# Patient Record
Sex: MALE | Race: WHITE | NOT HISPANIC OR LATINO | Employment: UNEMPLOYED | URBAN - METROPOLITAN AREA
[De-identification: names, ages, dates, MRNs, and addresses within clinical notes are randomized per-mention and may not be internally consistent; named-entity substitution may affect disease eponyms.]

---

## 2020-04-20 ENCOUNTER — HOSPITAL ENCOUNTER (EMERGENCY)
Facility: HOSPITAL | Age: 35
Discharge: HOME | End: 2020-04-20
Attending: EMERGENCY MEDICINE

## 2020-04-20 VITALS
OXYGEN SATURATION: 97 % | DIASTOLIC BLOOD PRESSURE: 59 MMHG | HEIGHT: 71 IN | BODY MASS INDEX: 37.1 KG/M2 | HEART RATE: 110 BPM | TEMPERATURE: 98 F | WEIGHT: 265 LBS | SYSTOLIC BLOOD PRESSURE: 115 MMHG | RESPIRATION RATE: 11 BRPM

## 2020-04-20 DIAGNOSIS — K59.00 CONSTIPATION, UNSPECIFIED CONSTIPATION TYPE: Primary | ICD-10-CM

## 2020-04-20 DIAGNOSIS — R50.9 FEVER, UNSPECIFIED FEVER CAUSE: ICD-10-CM

## 2020-04-20 DIAGNOSIS — R00.0 TACHYCARDIA: ICD-10-CM

## 2020-04-20 DIAGNOSIS — K57.90 DIVERTICULOSIS: ICD-10-CM

## 2020-04-20 LAB
ALBUMIN SERPL BCP-MCNC: 3.7 G/DL (ref 3.5–5.2)
ALP SERPL-CCNC: 87 U/L (ref 55–135)
ALT SERPL W/O P-5'-P-CCNC: 39 U/L (ref 10–44)
AMPHET+METHAMPHET UR QL: NORMAL
ANION GAP SERPL CALC-SCNC: 7 MMOL/L (ref 8–16)
APTT BLDCRRT: 38.4 SEC (ref 21–32)
AST SERPL-CCNC: 38 U/L (ref 10–40)
BARBITURATES UR QL SCN>200 NG/ML: NEGATIVE
BASOPHILS # BLD AUTO: 0.03 K/UL (ref 0–0.2)
BASOPHILS NFR BLD: 0.3 % (ref 0–1.9)
BENZODIAZ UR QL SCN>200 NG/ML: NEGATIVE
BILIRUB SERPL-MCNC: 0.9 MG/DL (ref 0.1–1)
BILIRUB UR QL STRIP: NEGATIVE
BUN SERPL-MCNC: 10 MG/DL (ref 6–20)
BZE UR QL SCN: NEGATIVE
CALCIUM SERPL-MCNC: 8.6 MG/DL (ref 8.7–10.5)
CANNABINOIDS UR QL SCN: NORMAL
CHLORIDE SERPL-SCNC: 98 MMOL/L (ref 95–110)
CLARITY UR: CLEAR
CO2 SERPL-SCNC: 25 MMOL/L (ref 23–29)
COLOR UR: YELLOW
CREAT SERPL-MCNC: 0.7 MG/DL (ref 0.5–1.4)
CREAT UR-MCNC: 141.6 MG/DL (ref 23–375)
DIFFERENTIAL METHOD: ABNORMAL
EOSINOPHIL # BLD AUTO: 0 K/UL (ref 0–0.5)
EOSINOPHIL NFR BLD: 0.1 % (ref 0–8)
ERYTHROCYTE [DISTWIDTH] IN BLOOD BY AUTOMATED COUNT: 12.8 % (ref 11.5–14.5)
EST. GFR  (AFRICAN AMERICAN): >60 ML/MIN/1.73 M^2
EST. GFR  (NON AFRICAN AMERICAN): >60 ML/MIN/1.73 M^2
GLUCOSE SERPL-MCNC: 106 MG/DL (ref 70–110)
GLUCOSE UR QL STRIP: NEGATIVE
GROUP A STREP, MOLECULAR: NEGATIVE
HCT VFR BLD AUTO: 41.4 % (ref 40–54)
HGB BLD-MCNC: 13.7 G/DL (ref 14–18)
HGB UR QL STRIP: NEGATIVE
IMM GRANULOCYTES # BLD AUTO: 0.03 K/UL (ref 0–0.04)
IMM GRANULOCYTES NFR BLD AUTO: 0.3 % (ref 0–0.5)
INFLUENZA A, MOLECULAR: NEGATIVE
INFLUENZA B, MOLECULAR: NEGATIVE
INR PPP: 1.4 (ref 0.8–1.2)
KETONES UR QL STRIP: NEGATIVE
LACTATE SERPL-SCNC: 1.4 MMOL/L (ref 0.5–2.2)
LEUKOCYTE ESTERASE UR QL STRIP: NEGATIVE
LIPASE SERPL-CCNC: 17 U/L (ref 4–60)
LYMPHOCYTES # BLD AUTO: 1.2 K/UL (ref 1–4.8)
LYMPHOCYTES NFR BLD: 12.3 % (ref 18–48)
MCH RBC QN AUTO: 28.7 PG (ref 27–31)
MCHC RBC AUTO-ENTMCNC: 33.1 G/DL (ref 32–36)
MCV RBC AUTO: 87 FL (ref 82–98)
MONOCYTES # BLD AUTO: 1 K/UL (ref 0.3–1)
MONOCYTES NFR BLD: 10.4 % (ref 4–15)
NEUTROPHILS # BLD AUTO: 7.3 K/UL (ref 1.8–7.7)
NEUTROPHILS NFR BLD: 76.6 % (ref 38–73)
NITRITE UR QL STRIP: NEGATIVE
NRBC BLD-RTO: 0 /100 WBC
OPIATES UR QL SCN: NORMAL
PCP UR QL SCN>25 NG/ML: NEGATIVE
PH UR STRIP: 8 [PH] (ref 5–8)
PLATELET # BLD AUTO: 177 K/UL (ref 150–350)
PMV BLD AUTO: 9.9 FL (ref 9.2–12.9)
POTASSIUM SERPL-SCNC: 4 MMOL/L (ref 3.5–5.1)
PROT SERPL-MCNC: 7.9 G/DL (ref 6–8.4)
PROT UR QL STRIP: NEGATIVE
PROTHROMBIN TIME: 15.4 SEC (ref 9–12.5)
RBC # BLD AUTO: 4.78 M/UL (ref 4.6–6.2)
SODIUM SERPL-SCNC: 130 MMOL/L (ref 136–145)
SP GR UR STRIP: 1.01 (ref 1–1.03)
SPECIMEN SOURCE: NORMAL
TOXICOLOGY INFORMATION: NORMAL
URN SPEC COLLECT METH UR: NORMAL
UROBILINOGEN UR STRIP-ACNC: 1 EU/DL
WBC # BLD AUTO: 9.54 K/UL (ref 3.9–12.7)

## 2020-04-20 PROCEDURE — 71045 X-RAY EXAM CHEST 1 VIEW: CPT | Mod: TC,FY

## 2020-04-20 PROCEDURE — 71045 X-RAY EXAM CHEST 1 VIEW: CPT | Mod: 26,,, | Performed by: RADIOLOGY

## 2020-04-20 PROCEDURE — 87040 BLOOD CULTURE FOR BACTERIA: CPT

## 2020-04-20 PROCEDURE — 85730 THROMBOPLASTIN TIME PARTIAL: CPT

## 2020-04-20 PROCEDURE — 83690 ASSAY OF LIPASE: CPT

## 2020-04-20 PROCEDURE — 81003 URINALYSIS AUTO W/O SCOPE: CPT | Mod: 59

## 2020-04-20 PROCEDURE — 85025 COMPLETE CBC W/AUTO DIFF WBC: CPT

## 2020-04-20 PROCEDURE — 36415 COLL VENOUS BLD VENIPUNCTURE: CPT

## 2020-04-20 PROCEDURE — 99285 EMERGENCY DEPT VISIT HI MDM: CPT | Mod: 25

## 2020-04-20 PROCEDURE — 94760 N-INVAS EAR/PLS OXIMETRY 1: CPT

## 2020-04-20 PROCEDURE — 74176 CT ABD & PELVIS W/O CONTRAST: CPT | Mod: TC

## 2020-04-20 PROCEDURE — 74176 CT RENAL STONE STUDY ABD PELVIS WO: ICD-10-PCS | Mod: 26,,, | Performed by: RADIOLOGY

## 2020-04-20 PROCEDURE — 25000003 PHARM REV CODE 250: Performed by: EMERGENCY MEDICINE

## 2020-04-20 PROCEDURE — 74176 CT ABD & PELVIS W/O CONTRAST: CPT | Mod: 26,,, | Performed by: RADIOLOGY

## 2020-04-20 PROCEDURE — 71045 XR CHEST AP PORTABLE: ICD-10-PCS | Mod: 26,,, | Performed by: RADIOLOGY

## 2020-04-20 PROCEDURE — 87502 INFLUENZA DNA AMP PROBE: CPT

## 2020-04-20 PROCEDURE — 96361 HYDRATE IV INFUSION ADD-ON: CPT

## 2020-04-20 PROCEDURE — 87651 STREP A DNA AMP PROBE: CPT

## 2020-04-20 PROCEDURE — 80053 COMPREHEN METABOLIC PANEL: CPT

## 2020-04-20 PROCEDURE — 83605 ASSAY OF LACTIC ACID: CPT

## 2020-04-20 PROCEDURE — 96360 HYDRATION IV INFUSION INIT: CPT

## 2020-04-20 PROCEDURE — 80307 DRUG TEST PRSMV CHEM ANLYZR: CPT

## 2020-04-20 PROCEDURE — 85610 PROTHROMBIN TIME: CPT

## 2020-04-20 RX ORDER — ACETAMINOPHEN 325 MG/1
650 TABLET ORAL
Status: COMPLETED | OUTPATIENT
Start: 2020-04-20 | End: 2020-04-20

## 2020-04-20 RX ADMIN — ACETAMINOPHEN 650 MG: 325 TABLET ORAL at 02:04

## 2020-04-20 RX ADMIN — SODIUM CHLORIDE 3606 ML: 0.9 INJECTION, SOLUTION INTRAVENOUS at 12:04

## 2020-04-20 NOTE — ED TRIAGE NOTES
Patient presents to ED POV with c/o fever, fatigue, and vomiting x1 month. He also reports MERSA that began on his hand and has spread. He reports that he finished antibiotics 2 weeks ago. Unsure of name. He is AAOx4. Skin warm, dry to touch. Respirations even, nonlabored.

## 2020-04-21 NOTE — ED PROVIDER NOTES
"Encounter Date: 4/20/2020       History     Chief Complaint   Patient presents with    Vomiting    Weakness     34-year-old male no significant past medical history presents to the ED for evaluation of fever, fatigue, and vomiting x1 month.  States he was seen for "MRSA" that began on his hand and "has spread" despite finishing unknown antibiotics approximately 2 weeks ago.  Denies chills, diarrhea, cough, URI symptoms.  Denies shortness of breath, chest pain, palpitations, diaphoresis.        Review of patient's allergies indicates:  No Known Allergies  History reviewed. No pertinent past medical history.  History reviewed. No pertinent surgical history.  History reviewed. No pertinent family history.  Social History     Tobacco Use    Smoking status: Current Every Day Smoker     Packs/day: 1.00     Types: Cigarettes    Smokeless tobacco: Never Used   Substance Use Topics    Alcohol use: Never     Frequency: Never    Drug use: Yes     Types: Methamphetamines     Comment: last used one week ago     Review of Systems   Constitutional: Positive for fatigue and fever. Negative for appetite change, chills and diaphoresis.   HENT: Negative for congestion, ear pain, rhinorrhea, sinus pressure, sinus pain, sore throat and tinnitus.    Eyes: Negative for photophobia and visual disturbance.   Respiratory: Negative for cough, chest tightness, shortness of breath and wheezing.    Cardiovascular: Negative for chest pain, palpitations and leg swelling.   Gastrointestinal: Positive for vomiting. Negative for abdominal pain, constipation, diarrhea and nausea.   Endocrine: Negative for cold intolerance, heat intolerance, polydipsia, polyphagia and polyuria.   Genitourinary: Negative for decreased urine volume, difficulty urinating, dysuria, flank pain, frequency, hematuria and urgency.   Musculoskeletal: Negative for arthralgias, back pain, gait problem, joint swelling, myalgias, neck pain and neck stiffness.   Skin: Positive " for color change. Negative for pallor, rash and wound.   Allergic/Immunologic: Negative for immunocompromised state.   Neurological: Positive for weakness. Negative for dizziness, syncope, light-headedness, numbness and headaches.   Hematological: Negative for adenopathy. Does not bruise/bleed easily.   Psychiatric/Behavioral: Negative for decreased concentration, dysphoric mood and sleep disturbance. The patient is not nervous/anxious.    All other systems reviewed and are negative.      Physical Exam     Initial Vitals [04/20/20 1205]   BP Pulse Resp Temp SpO2   126/77 (!) 114 16 (!) 100.8 °F (38.2 °C) 98 %      MAP       --         Physical Exam    Nursing note and vitals reviewed.  Constitutional: He appears well-developed and well-nourished. He is not diaphoretic. No distress.   Febrile   HENT:   Head: Normocephalic and atraumatic.   Right Ear: External ear normal.   Left Ear: External ear normal.   Nose: Nose normal.   Mouth/Throat: Oropharynx is clear and moist.   Eyes: Conjunctivae are normal. Pupils are equal, round, and reactive to light. No scleral icterus.   Neck: Normal range of motion. Neck supple. No JVD present.   Cardiovascular: Regular rhythm, normal heart sounds and intact distal pulses. Tachycardia present.    Pulmonary/Chest: Breath sounds normal. No respiratory distress. He has no wheezes. He has no rhonchi. He has no rales. He exhibits no tenderness.   Abdominal: Soft. Bowel sounds are normal. He exhibits no distension. There is no tenderness. There is no rebound and no guarding.   Musculoskeletal: Normal range of motion. He exhibits no edema or tenderness.   Lymphadenopathy:     He has no cervical adenopathy.   Neurological: He is alert and oriented to person, place, and time. GCS score is 15. GCS eye subscore is 4. GCS verbal subscore is 5. GCS motor subscore is 6.   Skin: Skin is warm and dry. Capillary refill takes less than 2 seconds. No rash and no abscess noted. No erythema. No pallor.    Multiple healing abscesses   Psychiatric: He has a normal mood and affect. His behavior is normal. Judgment and thought content normal.         ED Course   Procedures  Labs Reviewed   CBC W/ AUTO DIFFERENTIAL - Abnormal; Notable for the following components:       Result Value    Hemoglobin 13.7 (*)     Gran% 76.6 (*)     Lymph% 12.3 (*)     All other components within normal limits   COMPREHENSIVE METABOLIC PANEL - Abnormal; Notable for the following components:    Sodium 130 (*)     Calcium 8.6 (*)     Anion Gap 7 (*)     All other components within normal limits   APTT - Abnormal; Notable for the following components:    aPTT 38.4 (*)     All other components within normal limits   PROTIME-INR - Abnormal; Notable for the following components:    Prothrombin Time 15.4 (*)     INR 1.4 (*)     All other components within normal limits   CULTURE, BLOOD    Narrative:     Aerobic and anaerobic   CULTURE, BLOOD    Narrative:     Aerobic and anaerobic   INFLUENZA A & B BY MOLECULAR   GROUP A STREP, MOLECULAR   LACTIC ACID, PLASMA   URINALYSIS, REFLEX TO URINE CULTURE    Narrative:     Preferred Collection Type->Urine, Clean Catch   LIPASE   DRUG SCREEN PANEL, URINE EMERGENCY    Narrative:     Preferred Collection Type->Urine, Clean Catch          Imaging Results          CT Renal Stone Study ABD Pelvis WO (Final result)  Result time 04/20/20 14:42:06    Final result by Corey Gilmore MD (04/20/20 14:42:06)                 Impression:      1. No renal calculi.  2. Large amount of stool throughout the colon and rectum without plain film evidence for bowel obstruction.  3. Diverticulosis without CT evidence to suggest acute diverticulitis.      Electronically signed by: Corey Gilmore  Date:    04/20/2020  Time:    14:42             Narrative:    EXAMINATION:  CT RENAL STONE STUDY ABD PELVIS WO    CLINICAL HISTORY:  Abdominal pain, unspecified;Flank pain, stone disease suspected;    TECHNIQUE:  Low dose axial images,  sagittal and coronal reformations were obtained from the lung bases to the pubic symphysis.  Contrast was not administered.    COMPARISON:  None    FINDINGS:  There is linear discoid atelectasis at the lung bases.  No pleural or pericardial effusions.    The liver and spleen are normal in size and attenuation.  The gallbladder, pancreas and adrenal glands are unremarkable.    Kidneys are normal in size and attenuation.  No renal calculi.  No changes of hydronephrosis.  No perinephric inflammatory change.    Large amount of stool throughout the colon and rectum.  Scattered diverticula within the colon.  No mesenteric inflammatory change.  Appendix is normal in caliber.    The bladder is distended.  Prostate, seminal vesicles and rectum are unremarkable.    No significant mesenteric or retroperitoneal lymphadenopathy.                               X-Ray Chest AP Portable (Final result)  Result time 04/20/20 12:44:00    Final result by Corey Gilmore MD (04/20/20 12:44:00)                 Impression:      Mild pulmonary hypoinflation without focal consolidation.      Electronically signed by: Corey Gilmore  Date:    04/20/2020  Time:    12:44             Narrative:    EXAMINATION:  XR CHEST AP PORTABLE    CLINICAL HISTORY:  Sepsis;    TECHNIQUE:  Single frontal view of the chest was performed.    COMPARISON:  None    FINDINGS:  There is mild pulmonary hypoinflation mild crowding of the bronchopulmonary vessels.  No focal consolidation.  No significant pleural effusion.    Heart size is normal.  Mediastinal contours unremarkable.  Trachea midline.    Bony thorax intact.                                 Medical Decision Making:   ED Management:  Patient admits to a history of IV drug abuse; however, no murmur, no splinter hemorrhages/petechia, no Janeway lesions, no Osler's nodes, no Chaudhari spots on exam.  Workup seemingly normal.  Blood cultures have been obtained.  As recommended this patient undergo an outpatient  nonemergent echo for further evaluation.  He has been provided a referral to primary care for follow-up.                                   Clinical Impression:       ICD-10-CM ICD-9-CM   1. Constipation, unspecified constipation type K59.00 564.00   2. Tachycardia R00.0 785.0   3. Diverticulosis K57.90 562.10   4. Fever, unspecified fever cause R50.9 780.60         Disposition:   Disposition: Discharged  Condition: Stable     ED Disposition Condition    Discharge Stable        ED Prescriptions     None        Follow-up Information    None                                    Meme Malik MD  04/21/20 0849

## 2020-04-24 LAB
BACTERIA BLD CULT: NORMAL
BACTERIA BLD CULT: NORMAL

## 2024-08-04 PROCEDURE — 99285 EMERGENCY DEPT VISIT HI MDM: CPT | Mod: 25

## 2024-08-05 ENCOUNTER — HOSPITAL ENCOUNTER (EMERGENCY)
Facility: HOSPITAL | Age: 39
Discharge: HOME | End: 2024-08-05
Attending: EMERGENCY MEDICINE
Payer: COMMERCIAL

## 2024-08-05 VITALS
SYSTOLIC BLOOD PRESSURE: 110 MMHG | WEIGHT: 260 LBS | HEART RATE: 73 BPM | RESPIRATION RATE: 16 BRPM | HEIGHT: 71 IN | OXYGEN SATURATION: 99 % | TEMPERATURE: 98 F | DIASTOLIC BLOOD PRESSURE: 69 MMHG | BODY MASS INDEX: 36.4 KG/M2

## 2024-08-05 DIAGNOSIS — R11.2 NAUSEA WITH VOMITING: ICD-10-CM

## 2024-08-05 DIAGNOSIS — K59.00 CONSTIPATION, UNSPECIFIED CONSTIPATION TYPE: Primary | ICD-10-CM

## 2024-08-05 LAB
ALBUMIN SERPL BCP-MCNC: 3.7 G/DL (ref 3.5–5.2)
ALP SERPL-CCNC: 93 U/L (ref 55–135)
ALT SERPL W/O P-5'-P-CCNC: 16 U/L (ref 10–44)
ANION GAP SERPL CALC-SCNC: 10 MMOL/L (ref 8–16)
AST SERPL-CCNC: 21 U/L (ref 10–40)
BASOPHILS # BLD AUTO: 0.02 K/UL (ref 0–0.2)
BASOPHILS NFR BLD: 0.4 % (ref 0–1.9)
BILIRUB SERPL-MCNC: 0.3 MG/DL (ref 0.1–1)
BILIRUB UR QL STRIP: NEGATIVE
BUN SERPL-MCNC: 11 MG/DL (ref 6–20)
BUN SERPL-MCNC: 14 MG/DL (ref 6–30)
CALCIUM SERPL-MCNC: 9.6 MG/DL (ref 8.7–10.5)
CHLORIDE SERPL-SCNC: 101 MMOL/L (ref 95–110)
CHLORIDE SERPL-SCNC: 104 MMOL/L (ref 95–110)
CLARITY UR REFRACT.AUTO: CLEAR
CO2 SERPL-SCNC: 25 MMOL/L (ref 23–29)
COLOR UR AUTO: YELLOW
CREAT SERPL-MCNC: 0.8 MG/DL (ref 0.5–1.4)
CREAT SERPL-MCNC: 0.9 MG/DL (ref 0.5–1.4)
DIFFERENTIAL METHOD BLD: ABNORMAL
EOSINOPHIL # BLD AUTO: 0.1 K/UL (ref 0–0.5)
EOSINOPHIL NFR BLD: 1.8 % (ref 0–8)
ERYTHROCYTE [DISTWIDTH] IN BLOOD BY AUTOMATED COUNT: 12.3 % (ref 11.5–14.5)
EST. GFR  (NO RACE VARIABLE): >60 ML/MIN/1.73 M^2
GLUCOSE SERPL-MCNC: 104 MG/DL (ref 70–110)
GLUCOSE SERPL-MCNC: 108 MG/DL (ref 70–110)
GLUCOSE UR QL STRIP: NEGATIVE
HCT VFR BLD AUTO: 46.5 % (ref 40–54)
HCT VFR BLD CALC: 44 %PCV (ref 36–54)
HCV AB SERPL QL IA: NORMAL
HGB BLD-MCNC: 15 G/DL (ref 14–18)
HGB UR QL STRIP: NEGATIVE
HIV 1+2 AB+HIV1 P24 AG SERPL QL IA: NORMAL
IMM GRANULOCYTES # BLD AUTO: 0.02 K/UL (ref 0–0.04)
IMM GRANULOCYTES NFR BLD AUTO: 0.4 % (ref 0–0.5)
KETONES UR QL STRIP: NEGATIVE
LEUKOCYTE ESTERASE UR QL STRIP: NEGATIVE
LIPASE SERPL-CCNC: 7 U/L (ref 4–60)
LYMPHOCYTES # BLD AUTO: 1.4 K/UL (ref 1–4.8)
LYMPHOCYTES NFR BLD: 24.8 % (ref 18–48)
MCH RBC QN AUTO: 29 PG (ref 27–31)
MCHC RBC AUTO-ENTMCNC: 32.3 G/DL (ref 32–36)
MCV RBC AUTO: 90 FL (ref 82–98)
MONOCYTES # BLD AUTO: 0.4 K/UL (ref 0.3–1)
MONOCYTES NFR BLD: 7.4 % (ref 4–15)
NEUTROPHILS # BLD AUTO: 3.6 K/UL (ref 1.8–7.7)
NEUTROPHILS NFR BLD: 65.2 % (ref 38–73)
NITRITE UR QL STRIP: NEGATIVE
NRBC BLD-RTO: 0 /100 WBC
OHS QRS DURATION: 88 MS
OHS QTC CALCULATION: 409 MS
PH UR STRIP: 7 [PH] (ref 5–8)
PLATELET # BLD AUTO: 146 K/UL (ref 150–450)
PMV BLD AUTO: 10.9 FL (ref 9.2–12.9)
POC IONIZED CALCIUM: 1.19 MMOL/L (ref 1.06–1.42)
POC TCO2 (MEASURED): 29 MMOL/L (ref 23–29)
POTASSIUM BLD-SCNC: 4.4 MMOL/L (ref 3.5–5.1)
POTASSIUM SERPL-SCNC: 4.4 MMOL/L (ref 3.5–5.1)
PROT SERPL-MCNC: 8.2 G/DL (ref 6–8.4)
PROT UR QL STRIP: ABNORMAL
RBC # BLD AUTO: 5.18 M/UL (ref 4.6–6.2)
SAMPLE: NORMAL
SODIUM BLD-SCNC: 139 MMOL/L (ref 136–145)
SODIUM SERPL-SCNC: 139 MMOL/L (ref 136–145)
SP GR UR STRIP: >1.03 (ref 1–1.03)
URN SPEC COLLECT METH UR: ABNORMAL
WBC # BLD AUTO: 5.56 K/UL (ref 3.9–12.7)

## 2024-08-05 PROCEDURE — 93005 ELECTROCARDIOGRAM TRACING: CPT

## 2024-08-05 PROCEDURE — 87389 HIV-1 AG W/HIV-1&-2 AB AG IA: CPT | Performed by: PHYSICIAN ASSISTANT

## 2024-08-05 PROCEDURE — 80047 BASIC METABLC PNL IONIZED CA: CPT

## 2024-08-05 PROCEDURE — 83690 ASSAY OF LIPASE: CPT | Performed by: EMERGENCY MEDICINE

## 2024-08-05 PROCEDURE — 63600175 PHARM REV CODE 636 W HCPCS

## 2024-08-05 PROCEDURE — 93010 ELECTROCARDIOGRAM REPORT: CPT | Mod: ,,, | Performed by: INTERNAL MEDICINE

## 2024-08-05 PROCEDURE — 25500020 PHARM REV CODE 255: Performed by: EMERGENCY MEDICINE

## 2024-08-05 PROCEDURE — 85025 COMPLETE CBC W/AUTO DIFF WBC: CPT | Performed by: EMERGENCY MEDICINE

## 2024-08-05 PROCEDURE — 25000003 PHARM REV CODE 250: Performed by: EMERGENCY MEDICINE

## 2024-08-05 PROCEDURE — 86803 HEPATITIS C AB TEST: CPT | Performed by: PHYSICIAN ASSISTANT

## 2024-08-05 PROCEDURE — 96361 HYDRATE IV INFUSION ADD-ON: CPT

## 2024-08-05 PROCEDURE — 81003 URINALYSIS AUTO W/O SCOPE: CPT | Performed by: EMERGENCY MEDICINE

## 2024-08-05 PROCEDURE — 96374 THER/PROPH/DIAG INJ IV PUSH: CPT

## 2024-08-05 PROCEDURE — 80053 COMPREHEN METABOLIC PANEL: CPT | Performed by: EMERGENCY MEDICINE

## 2024-08-05 RX ORDER — ONDANSETRON 4 MG/1
4 TABLET, ORALLY DISINTEGRATING ORAL EVERY 6 HOURS PRN
Qty: 15 TABLET | Refills: 0 | Status: SHIPPED | OUTPATIENT
Start: 2024-08-05

## 2024-08-05 RX ORDER — POLYETHYLENE GLYCOL 3350 17 G/17G
17 POWDER, FOR SOLUTION ORAL DAILY
Qty: 510 G | Refills: 0 | Status: SHIPPED | OUTPATIENT
Start: 2024-08-05

## 2024-08-05 RX ORDER — ONDANSETRON HYDROCHLORIDE 2 MG/ML
8 INJECTION, SOLUTION INTRAVENOUS
Status: COMPLETED | OUTPATIENT
Start: 2024-08-05 | End: 2024-08-05

## 2024-08-05 RX ORDER — DOCUSATE SODIUM 100 MG/1
100 CAPSULE, LIQUID FILLED ORAL 2 TIMES DAILY PRN
Qty: 60 CAPSULE | Refills: 0 | Status: SHIPPED | OUTPATIENT
Start: 2024-08-05 | End: 2024-09-04

## 2024-08-05 RX ORDER — ONDANSETRON HYDROCHLORIDE 2 MG/ML
INJECTION, SOLUTION INTRAVENOUS
Status: COMPLETED
Start: 2024-08-05 | End: 2024-08-05

## 2024-08-05 RX ORDER — SYRING-NEEDL,DISP,INSUL,0.3 ML 29 G X1/2"
296 SYRINGE, EMPTY DISPOSABLE MISCELLANEOUS ONCE
Qty: 296 ML | Refills: 0 | Status: SHIPPED | OUTPATIENT
Start: 2024-08-05 | End: 2024-08-05

## 2024-08-05 RX ADMIN — ONDANSETRON HYDROCHLORIDE 8 MG: 2 INJECTION, SOLUTION INTRAVENOUS at 03:08

## 2024-08-05 RX ADMIN — ONDANSETRON 8 MG: 2 INJECTION INTRAMUSCULAR; INTRAVENOUS at 03:08

## 2024-08-05 RX ADMIN — IOHEXOL 100 ML: 350 INJECTION, SOLUTION INTRAVENOUS at 03:08

## 2024-08-05 RX ADMIN — SODIUM CHLORIDE 1000 ML: 0.9 INJECTION, SOLUTION INTRAVENOUS at 01:08

## 2024-10-13 ENCOUNTER — HOSPITAL ENCOUNTER (INPATIENT)
Facility: HOSPITAL | Age: 39
LOS: 2 days | Discharge: HOME | DRG: 394 | End: 2024-10-18
Attending: EMERGENCY MEDICINE
Payer: COMMERCIAL

## 2024-10-13 DIAGNOSIS — F19.90 EXCESSIVE USE OF NONSTEROIDAL ANTI-INFLAMMATORY DRUG (NSAID): ICD-10-CM

## 2024-10-13 DIAGNOSIS — D64.9 ANEMIA, UNSPECIFIED TYPE: Primary | ICD-10-CM

## 2024-10-13 DIAGNOSIS — K92.2 GI BLEED: ICD-10-CM

## 2024-10-13 DIAGNOSIS — K92.1 HEMATOCHEZIA: ICD-10-CM

## 2024-10-13 DIAGNOSIS — K92.1 GASTROINTESTINAL HEMORRHAGE WITH MELENA: ICD-10-CM

## 2024-10-13 DIAGNOSIS — F19.10 POLYSUBSTANCE ABUSE: ICD-10-CM

## 2024-10-13 LAB
ABO + RH BLD: NORMAL
ALBUMIN SERPL BCP-MCNC: 3.7 G/DL (ref 3.5–5.2)
ALP SERPL-CCNC: 80 U/L (ref 55–135)
ALT SERPL W/O P-5'-P-CCNC: 7 U/L (ref 10–44)
ANION GAP SERPL CALC-SCNC: 12 MMOL/L (ref 8–16)
AST SERPL-CCNC: 13 U/L (ref 10–40)
BASOPHILS # BLD AUTO: 0.02 K/UL (ref 0–0.2)
BASOPHILS NFR BLD: 0.2 % (ref 0–1.9)
BILIRUB SERPL-MCNC: 0.5 MG/DL (ref 0.1–1)
BLD GP AB SCN CELLS X3 SERPL QL: NORMAL
BUN SERPL-MCNC: 6 MG/DL (ref 6–20)
CALCIUM SERPL-MCNC: 9.8 MG/DL (ref 8.7–10.5)
CHLORIDE SERPL-SCNC: 101 MMOL/L (ref 95–110)
CO2 SERPL-SCNC: 26 MMOL/L (ref 23–29)
CREAT SERPL-MCNC: 0.8 MG/DL (ref 0.5–1.4)
DIFFERENTIAL METHOD BLD: ABNORMAL
EOSINOPHIL # BLD AUTO: 0 K/UL (ref 0–0.5)
EOSINOPHIL NFR BLD: 0.2 % (ref 0–8)
ERYTHROCYTE [DISTWIDTH] IN BLOOD BY AUTOMATED COUNT: 13.2 % (ref 11.5–14.5)
EST. GFR  (NO RACE VARIABLE): >60 ML/MIN/1.73 M^2
GLUCOSE SERPL-MCNC: 97 MG/DL (ref 70–110)
HCT VFR BLD AUTO: 39.1 % (ref 40–54)
HGB BLD-MCNC: 12.7 G/DL (ref 14–18)
IMM GRANULOCYTES # BLD AUTO: 0.03 K/UL (ref 0–0.04)
IMM GRANULOCYTES NFR BLD AUTO: 0.3 % (ref 0–0.5)
LIPASE SERPL-CCNC: 7 U/L (ref 4–60)
LYMPHOCYTES # BLD AUTO: 2 K/UL (ref 1–4.8)
LYMPHOCYTES NFR BLD: 18.5 % (ref 18–48)
MCH RBC QN AUTO: 27.4 PG (ref 27–31)
MCHC RBC AUTO-ENTMCNC: 32.5 G/DL (ref 32–36)
MCV RBC AUTO: 84 FL (ref 82–98)
MONOCYTES # BLD AUTO: 1.1 K/UL (ref 0.3–1)
MONOCYTES NFR BLD: 10.8 % (ref 4–15)
NEUTROPHILS # BLD AUTO: 7.4 K/UL (ref 1.8–7.7)
NEUTROPHILS NFR BLD: 70 % (ref 38–73)
NRBC BLD-RTO: 0 /100 WBC
PLATELET # BLD AUTO: 257 K/UL (ref 150–450)
PMV BLD AUTO: 9.9 FL (ref 9.2–12.9)
POTASSIUM SERPL-SCNC: 3.9 MMOL/L (ref 3.5–5.1)
PROT SERPL-MCNC: 8.3 G/DL (ref 6–8.4)
RBC # BLD AUTO: 4.63 M/UL (ref 4.6–6.2)
SODIUM SERPL-SCNC: 139 MMOL/L (ref 136–145)
SPECIMEN OUTDATE: NORMAL
WBC # BLD AUTO: 10.55 K/UL (ref 3.9–12.7)

## 2024-10-13 PROCEDURE — 96374 THER/PROPH/DIAG INJ IV PUSH: CPT | Mod: 59

## 2024-10-13 PROCEDURE — 99285 EMERGENCY DEPT VISIT HI MDM: CPT | Mod: 25

## 2024-10-13 PROCEDURE — 80053 COMPREHEN METABOLIC PANEL: CPT | Performed by: EMERGENCY MEDICINE

## 2024-10-13 PROCEDURE — 96375 TX/PRO/DX INJ NEW DRUG ADDON: CPT

## 2024-10-13 PROCEDURE — 94761 N-INVAS EAR/PLS OXIMETRY MLT: CPT

## 2024-10-13 PROCEDURE — 93010 ELECTROCARDIOGRAM REPORT: CPT | Mod: ,,, | Performed by: INTERNAL MEDICINE

## 2024-10-13 PROCEDURE — 86850 RBC ANTIBODY SCREEN: CPT | Performed by: EMERGENCY MEDICINE

## 2024-10-13 PROCEDURE — 25500020 PHARM REV CODE 255: Performed by: EMERGENCY MEDICINE

## 2024-10-13 PROCEDURE — 86901 BLOOD TYPING SEROLOGIC RH(D): CPT | Performed by: EMERGENCY MEDICINE

## 2024-10-13 PROCEDURE — 85025 COMPLETE CBC W/AUTO DIFF WBC: CPT | Performed by: EMERGENCY MEDICINE

## 2024-10-13 PROCEDURE — 63600175 PHARM REV CODE 636 W HCPCS: Performed by: EMERGENCY MEDICINE

## 2024-10-13 PROCEDURE — 83690 ASSAY OF LIPASE: CPT | Performed by: EMERGENCY MEDICINE

## 2024-10-13 PROCEDURE — 93005 ELECTROCARDIOGRAM TRACING: CPT

## 2024-10-13 RX ORDER — ONDANSETRON HYDROCHLORIDE 2 MG/ML
8 INJECTION, SOLUTION INTRAVENOUS
Status: COMPLETED | OUTPATIENT
Start: 2024-10-13 | End: 2024-10-13

## 2024-10-13 RX ORDER — PANTOPRAZOLE SODIUM 40 MG/10ML
80 INJECTION, POWDER, LYOPHILIZED, FOR SOLUTION INTRAVENOUS
Status: COMPLETED | OUTPATIENT
Start: 2024-10-13 | End: 2024-10-13

## 2024-10-13 RX ORDER — MORPHINE SULFATE 4 MG/ML
4 INJECTION, SOLUTION INTRAMUSCULAR; INTRAVENOUS
Status: COMPLETED | OUTPATIENT
Start: 2024-10-13 | End: 2024-10-13

## 2024-10-13 RX ADMIN — MORPHINE SULFATE 4 MG: 4 INJECTION INTRAVENOUS at 09:10

## 2024-10-13 RX ADMIN — IOHEXOL 100 ML: 350 INJECTION, SOLUTION INTRAVENOUS at 11:10

## 2024-10-13 RX ADMIN — PANTOPRAZOLE SODIUM 80 MG: 40 INJECTION, POWDER, LYOPHILIZED, FOR SOLUTION INTRAVENOUS at 09:10

## 2024-10-13 RX ADMIN — ONDANSETRON 8 MG: 2 INJECTION INTRAMUSCULAR; INTRAVENOUS at 10:10

## 2024-10-14 ENCOUNTER — ANESTHESIA EVENT (OUTPATIENT)
Dept: ENDOSCOPY | Facility: HOSPITAL | Age: 39
End: 2024-10-14
Payer: COMMERCIAL

## 2024-10-14 ENCOUNTER — ANESTHESIA (OUTPATIENT)
Dept: ENDOSCOPY | Facility: HOSPITAL | Age: 39
End: 2024-10-14
Payer: COMMERCIAL

## 2024-10-14 PROBLEM — F19.90 EXCESSIVE USE OF NONSTEROIDAL ANTI-INFLAMMATORY DRUG (NSAID): Status: ACTIVE | Noted: 2024-10-14

## 2024-10-14 PROBLEM — K92.2 GI BLEED: Status: ACTIVE | Noted: 2024-10-14

## 2024-10-14 PROBLEM — F15.10 METHAMPHETAMINE USE: Status: ACTIVE | Noted: 2024-10-14

## 2024-10-14 PROBLEM — F17.210 CIGARETTE NICOTINE DEPENDENCE WITHOUT COMPLICATION: Status: ACTIVE | Noted: 2024-10-14

## 2024-10-14 PROBLEM — D64.9 ANEMIA: Status: ACTIVE | Noted: 2024-10-14

## 2024-10-14 PROBLEM — F19.10 POLYSUBSTANCE ABUSE: Status: ACTIVE | Noted: 2024-10-14

## 2024-10-14 PROBLEM — M54.9 CHRONIC BACK PAIN: Status: ACTIVE | Noted: 2024-10-14

## 2024-10-14 PROBLEM — G89.29 CHRONIC BACK PAIN: Status: ACTIVE | Noted: 2024-10-14

## 2024-10-14 PROBLEM — F17.200 NICOTINE DEPENDENCE: Status: ACTIVE | Noted: 2024-10-14

## 2024-10-14 LAB
ALBUMIN SERPL BCP-MCNC: 3.4 G/DL (ref 3.5–5.2)
ALP SERPL-CCNC: 74 U/L (ref 55–135)
ALT SERPL W/O P-5'-P-CCNC: 9 U/L (ref 10–44)
AMPHET+METHAMPHET UR QL: NEGATIVE
ANION GAP SERPL CALC-SCNC: 10 MMOL/L (ref 8–16)
APTT PPP: 28.9 SEC (ref 21–32)
AST SERPL-CCNC: 11 U/L (ref 10–40)
BARBITURATES UR QL SCN>200 NG/ML: NEGATIVE
BASOPHILS # BLD AUTO: 0.04 K/UL (ref 0–0.2)
BASOPHILS NFR BLD: 0.4 % (ref 0–1.9)
BENZODIAZ UR QL SCN>200 NG/ML: NEGATIVE
BILIRUB SERPL-MCNC: 0.5 MG/DL (ref 0.1–1)
BILIRUB UR QL STRIP: NEGATIVE
BUN SERPL-MCNC: 6 MG/DL (ref 6–20)
BZE UR QL SCN: NEGATIVE
CALCIUM SERPL-MCNC: 9.4 MG/DL (ref 8.7–10.5)
CANNABINOIDS UR QL SCN: ABNORMAL
CHLORIDE SERPL-SCNC: 103 MMOL/L (ref 95–110)
CLARITY UR REFRACT.AUTO: CLEAR
CO2 SERPL-SCNC: 25 MMOL/L (ref 23–29)
COLOR UR AUTO: YELLOW
CREAT SERPL-MCNC: 0.7 MG/DL (ref 0.5–1.4)
CREAT UR-MCNC: 166 MG/DL (ref 23–375)
CREAT UR-MCNC: 166 MG/DL (ref 23–375)
DIFFERENTIAL METHOD BLD: ABNORMAL
EOSINOPHIL # BLD AUTO: 0.1 K/UL (ref 0–0.5)
EOSINOPHIL NFR BLD: 0.8 % (ref 0–8)
ERYTHROCYTE [DISTWIDTH] IN BLOOD BY AUTOMATED COUNT: 13.2 % (ref 11.5–14.5)
EST. GFR  (NO RACE VARIABLE): >60 ML/MIN/1.73 M^2
ETHANOL UR-MCNC: <10 MG/DL
FENTANYL UR QL SCN: ABNORMAL
FERRITIN SERPL-MCNC: 185 NG/ML (ref 20–300)
FOLATE SERPL-MCNC: 6.7 NG/ML (ref 4–24)
GLUCOSE SERPL-MCNC: 106 MG/DL (ref 70–110)
GLUCOSE UR QL STRIP: NEGATIVE
HCT VFR BLD AUTO: 35.7 % (ref 40–54)
HGB BLD-MCNC: 11.6 G/DL (ref 14–18)
HGB UR QL STRIP: NEGATIVE
IMM GRANULOCYTES # BLD AUTO: 0.03 K/UL (ref 0–0.04)
IMM GRANULOCYTES NFR BLD AUTO: 0.3 % (ref 0–0.5)
INR PPP: 1 (ref 0.8–1.2)
IRON SERPL-MCNC: 48 UG/DL (ref 45–160)
KETONES UR QL STRIP: NEGATIVE
LEUKOCYTE ESTERASE UR QL STRIP: NEGATIVE
LYMPHOCYTES # BLD AUTO: 2.3 K/UL (ref 1–4.8)
LYMPHOCYTES NFR BLD: 23.4 % (ref 18–48)
MAGNESIUM SERPL-MCNC: 1.9 MG/DL (ref 1.6–2.6)
MCH RBC QN AUTO: 27.7 PG (ref 27–31)
MCHC RBC AUTO-ENTMCNC: 32.5 G/DL (ref 32–36)
MCV RBC AUTO: 85 FL (ref 82–98)
METHADONE UR QL SCN>300 NG/ML: NEGATIVE
MONOCYTES # BLD AUTO: 0.8 K/UL (ref 0.3–1)
MONOCYTES NFR BLD: 7.9 % (ref 4–15)
NEUTROPHILS # BLD AUTO: 6.6 K/UL (ref 1.8–7.7)
NEUTROPHILS NFR BLD: 67.2 % (ref 38–73)
NITRITE UR QL STRIP: NEGATIVE
NRBC BLD-RTO: 0 /100 WBC
OHS QRS DURATION: 86 MS
OHS QTC CALCULATION: 426 MS
OPIATES UR QL SCN: ABNORMAL
PCP UR QL SCN>25 NG/ML: NEGATIVE
PH UR STRIP: 6 [PH] (ref 5–8)
PLATELET # BLD AUTO: 253 K/UL (ref 150–450)
PMV BLD AUTO: 9.9 FL (ref 9.2–12.9)
POTASSIUM SERPL-SCNC: 3.6 MMOL/L (ref 3.5–5.1)
PROT SERPL-MCNC: 7.7 G/DL (ref 6–8.4)
PROT UR QL STRIP: ABNORMAL
PROTHROMBIN TIME: 11.4 SEC (ref 9–12.5)
RBC # BLD AUTO: 4.19 M/UL (ref 4.6–6.2)
SATURATED IRON: 19 % (ref 20–50)
SODIUM SERPL-SCNC: 138 MMOL/L (ref 136–145)
SP GR UR STRIP: >1.03 (ref 1–1.03)
TOTAL IRON BINDING CAPACITY: 249 UG/DL (ref 250–450)
TOXICOLOGY INFORMATION: ABNORMAL
TRANSFERRIN SERPL-MCNC: 168 MG/DL (ref 200–375)
URN SPEC COLLECT METH UR: ABNORMAL
VIT B12 SERPL-MCNC: 350 PG/ML (ref 210–950)
WBC # BLD AUTO: 9.86 K/UL (ref 3.9–12.7)

## 2024-10-14 PROCEDURE — S4991 NICOTINE PATCH NONLEGEND: HCPCS | Performed by: NURSE PRACTITIONER

## 2024-10-14 PROCEDURE — G0378 HOSPITAL OBSERVATION PER HR: HCPCS

## 2024-10-14 PROCEDURE — 25000003 PHARM REV CODE 250: Performed by: STUDENT IN AN ORGANIZED HEALTH CARE EDUCATION/TRAINING PROGRAM

## 2024-10-14 PROCEDURE — 63600175 PHARM REV CODE 636 W HCPCS: Performed by: NURSE PRACTITIONER

## 2024-10-14 PROCEDURE — 43235 EGD DIAGNOSTIC BRUSH WASH: CPT | Mod: ,,, | Performed by: INTERNAL MEDICINE

## 2024-10-14 PROCEDURE — 99223 1ST HOSP IP/OBS HIGH 75: CPT | Mod: 25,,, | Performed by: INTERNAL MEDICINE

## 2024-10-14 PROCEDURE — 25000003 PHARM REV CODE 250

## 2024-10-14 PROCEDURE — 80307 DRUG TEST PRSMV CHEM ANLYZR: CPT | Performed by: NURSE PRACTITIONER

## 2024-10-14 PROCEDURE — 80053 COMPREHEN METABOLIC PANEL: CPT | Performed by: NURSE PRACTITIONER

## 2024-10-14 PROCEDURE — 85025 COMPLETE CBC W/AUTO DIFF WBC: CPT | Performed by: NURSE PRACTITIONER

## 2024-10-14 PROCEDURE — 43235 EGD DIAGNOSTIC BRUSH WASH: CPT | Performed by: INTERNAL MEDICINE

## 2024-10-14 PROCEDURE — 85610 PROTHROMBIN TIME: CPT | Performed by: NURSE PRACTITIONER

## 2024-10-14 PROCEDURE — 25000003 PHARM REV CODE 250: Performed by: NURSE PRACTITIONER

## 2024-10-14 PROCEDURE — 0DJ08ZZ INSPECTION OF UPPER INTESTINAL TRACT, VIA NATURAL OR ARTIFICIAL OPENING ENDOSCOPIC: ICD-10-PCS | Performed by: INTERNAL MEDICINE

## 2024-10-14 PROCEDURE — 83735 ASSAY OF MAGNESIUM: CPT | Performed by: NURSE PRACTITIONER

## 2024-10-14 PROCEDURE — 37000008 HC ANESTHESIA 1ST 15 MINUTES: Performed by: INTERNAL MEDICINE

## 2024-10-14 PROCEDURE — 96361 HYDRATE IV INFUSION ADD-ON: CPT

## 2024-10-14 PROCEDURE — 80307 DRUG TEST PRSMV CHEM ANLYZR: CPT | Mod: 91 | Performed by: NURSE PRACTITIONER

## 2024-10-14 PROCEDURE — 63600175 PHARM REV CODE 636 W HCPCS: Performed by: NURSE ANESTHETIST, CERTIFIED REGISTERED

## 2024-10-14 PROCEDURE — 81003 URINALYSIS AUTO W/O SCOPE: CPT | Mod: 59 | Performed by: NURSE PRACTITIONER

## 2024-10-14 PROCEDURE — 83540 ASSAY OF IRON: CPT | Performed by: NURSE PRACTITIONER

## 2024-10-14 PROCEDURE — 37000009 HC ANESTHESIA EA ADD 15 MINS: Performed by: INTERNAL MEDICINE

## 2024-10-14 PROCEDURE — 85730 THROMBOPLASTIN TIME PARTIAL: CPT | Performed by: NURSE PRACTITIONER

## 2024-10-14 PROCEDURE — 96366 THER/PROPH/DIAG IV INF ADDON: CPT

## 2024-10-14 PROCEDURE — 82728 ASSAY OF FERRITIN: CPT | Performed by: NURSE PRACTITIONER

## 2024-10-14 PROCEDURE — 96376 TX/PRO/DX INJ SAME DRUG ADON: CPT

## 2024-10-14 PROCEDURE — 84466 ASSAY OF TRANSFERRIN: CPT | Performed by: NURSE PRACTITIONER

## 2024-10-14 PROCEDURE — 82607 VITAMIN B-12: CPT | Performed by: NURSE PRACTITIONER

## 2024-10-14 PROCEDURE — 96365 THER/PROPH/DIAG IV INF INIT: CPT

## 2024-10-14 PROCEDURE — 99214 OFFICE O/P EST MOD 30 MIN: CPT | Mod: ,,, | Performed by: PSYCHIATRY & NEUROLOGY

## 2024-10-14 PROCEDURE — 82746 ASSAY OF FOLIC ACID SERUM: CPT | Performed by: NURSE PRACTITIONER

## 2024-10-14 PROCEDURE — 96375 TX/PRO/DX INJ NEW DRUG ADDON: CPT

## 2024-10-14 RX ORDER — ACETAMINOPHEN 500 MG
1000 TABLET ORAL ONCE
Status: COMPLETED | OUTPATIENT
Start: 2024-10-14 | End: 2024-10-14

## 2024-10-14 RX ORDER — PROCHLORPERAZINE EDISYLATE 5 MG/ML
5 INJECTION INTRAMUSCULAR; INTRAVENOUS EVERY 6 HOURS PRN
Status: DISCONTINUED | OUTPATIENT
Start: 2024-10-14 | End: 2024-10-18 | Stop reason: HOSPADM

## 2024-10-14 RX ORDER — POLYETHYLENE GLYCOL 3350, SODIUM SULFATE ANHYDROUS, SODIUM BICARBONATE, SODIUM CHLORIDE, POTASSIUM CHLORIDE 236; 22.74; 6.74; 5.86; 2.97 G/4L; G/4L; G/4L; G/4L; G/4L
4000 POWDER, FOR SOLUTION ORAL ONCE
Status: COMPLETED | OUTPATIENT
Start: 2024-10-14 | End: 2024-10-14

## 2024-10-14 RX ORDER — DICYCLOMINE HYDROCHLORIDE 10 MG/1
10 CAPSULE ORAL 4 TIMES DAILY PRN
Status: DISCONTINUED | OUTPATIENT
Start: 2024-10-14 | End: 2024-10-18 | Stop reason: HOSPADM

## 2024-10-14 RX ORDER — SUCRALFATE 1 G/10ML
1 SUSPENSION ORAL EVERY 6 HOURS
Status: DISCONTINUED | OUTPATIENT
Start: 2024-10-14 | End: 2024-10-18 | Stop reason: HOSPADM

## 2024-10-14 RX ORDER — PROPOFOL 10 MG/ML
VIAL (ML) INTRAVENOUS
Status: DISCONTINUED | OUTPATIENT
Start: 2024-10-14 | End: 2024-10-14

## 2024-10-14 RX ORDER — CLONIDINE HYDROCHLORIDE 0.1 MG/1
0.1 TABLET ORAL EVERY 4 HOURS PRN
Status: DISCONTINUED | OUTPATIENT
Start: 2024-10-14 | End: 2024-10-18 | Stop reason: HOSPADM

## 2024-10-14 RX ORDER — FENTANYL CITRATE 50 UG/ML
INJECTION, SOLUTION INTRAMUSCULAR; INTRAVENOUS
Status: DISCONTINUED | OUTPATIENT
Start: 2024-10-14 | End: 2024-10-14

## 2024-10-14 RX ORDER — SIMETHICONE 80 MG
2 TABLET,CHEWABLE ORAL ONCE
Status: COMPLETED | OUTPATIENT
Start: 2024-10-14 | End: 2024-10-14

## 2024-10-14 RX ORDER — METHOCARBAMOL 500 MG/1
500 TABLET, FILM COATED ORAL 4 TIMES DAILY PRN
Status: DISCONTINUED | OUTPATIENT
Start: 2024-10-14 | End: 2024-10-18 | Stop reason: HOSPADM

## 2024-10-14 RX ORDER — CYCLOBENZAPRINE HCL 10 MG
10 TABLET ORAL EVERY 8 HOURS PRN
Status: DISCONTINUED | OUTPATIENT
Start: 2024-10-14 | End: 2024-10-14

## 2024-10-14 RX ORDER — MAGNESIUM SULFATE 1 G/100ML
1 INJECTION INTRAVENOUS ONCE
Status: COMPLETED | OUTPATIENT
Start: 2024-10-14 | End: 2024-10-14

## 2024-10-14 RX ORDER — ONDANSETRON HYDROCHLORIDE 2 MG/ML
4 INJECTION, SOLUTION INTRAVENOUS EVERY 6 HOURS PRN
Status: DISCONTINUED | OUTPATIENT
Start: 2024-10-14 | End: 2024-10-18 | Stop reason: HOSPADM

## 2024-10-14 RX ORDER — IBUPROFEN 200 MG
1 TABLET ORAL DAILY
Status: DISCONTINUED | OUTPATIENT
Start: 2024-10-14 | End: 2024-10-18 | Stop reason: HOSPADM

## 2024-10-14 RX ORDER — GLUCAGON 1 MG
1 KIT INJECTION
Status: DISCONTINUED | OUTPATIENT
Start: 2024-10-14 | End: 2024-10-14 | Stop reason: HOSPADM

## 2024-10-14 RX ORDER — SIMETHICONE 80 MG
2 TABLET,CHEWABLE ORAL 3 TIMES DAILY PRN
Status: DISCONTINUED | OUTPATIENT
Start: 2024-10-14 | End: 2024-10-17

## 2024-10-14 RX ORDER — MORPHINE SULFATE 2 MG/ML
2 INJECTION, SOLUTION INTRAMUSCULAR; INTRAVENOUS EVERY 4 HOURS PRN
Status: DISCONTINUED | OUTPATIENT
Start: 2024-10-14 | End: 2024-10-14

## 2024-10-14 RX ORDER — LIDOCAINE HYDROCHLORIDE 20 MG/ML
INJECTION INTRAVENOUS
Status: DISCONTINUED | OUTPATIENT
Start: 2024-10-14 | End: 2024-10-14

## 2024-10-14 RX ORDER — HYDROXYZINE HYDROCHLORIDE 25 MG/1
50 TABLET, FILM COATED ORAL EVERY 6 HOURS PRN
Status: DISCONTINUED | OUTPATIENT
Start: 2024-10-14 | End: 2024-10-18 | Stop reason: HOSPADM

## 2024-10-14 RX ORDER — SUCRALFATE 1 G/10ML
1 SUSPENSION ORAL ONCE
Status: COMPLETED | OUTPATIENT
Start: 2024-10-14 | End: 2024-10-14

## 2024-10-14 RX ORDER — SODIUM CHLORIDE 0.9 % (FLUSH) 0.9 %
10 SYRINGE (ML) INJECTION
Status: DISCONTINUED | OUTPATIENT
Start: 2024-10-14 | End: 2024-10-14 | Stop reason: HOSPADM

## 2024-10-14 RX ORDER — LORAZEPAM 0.5 MG/1
1 TABLET ORAL EVERY 6 HOURS PRN
Status: DISCONTINUED | OUTPATIENT
Start: 2024-10-14 | End: 2024-10-18 | Stop reason: HOSPADM

## 2024-10-14 RX ORDER — DROPERIDOL 2.5 MG/ML
0.62 INJECTION, SOLUTION INTRAMUSCULAR; INTRAVENOUS ONCE
Status: COMPLETED | OUTPATIENT
Start: 2024-10-14 | End: 2024-10-14

## 2024-10-14 RX ORDER — PANTOPRAZOLE SODIUM 40 MG/10ML
40 INJECTION, POWDER, LYOPHILIZED, FOR SOLUTION INTRAVENOUS 2 TIMES DAILY
Status: DISCONTINUED | OUTPATIENT
Start: 2024-10-14 | End: 2024-10-18 | Stop reason: HOSPADM

## 2024-10-14 RX ORDER — LORAZEPAM 1 MG/1
1 TABLET ORAL ONCE
Status: COMPLETED | OUTPATIENT
Start: 2024-10-14 | End: 2024-10-14

## 2024-10-14 RX ORDER — ACETAMINOPHEN 325 MG/1
650 TABLET ORAL EVERY 6 HOURS PRN
Status: DISCONTINUED | OUTPATIENT
Start: 2024-10-14 | End: 2024-10-18 | Stop reason: HOSPADM

## 2024-10-14 RX ORDER — CYCLOBENZAPRINE HCL 5 MG
5 TABLET ORAL EVERY 8 HOURS PRN
Status: DISCONTINUED | OUTPATIENT
Start: 2024-10-14 | End: 2024-10-14

## 2024-10-14 RX ORDER — LIDOCAINE 50 MG/G
2 PATCH TOPICAL
Status: DISCONTINUED | OUTPATIENT
Start: 2024-10-14 | End: 2024-10-17

## 2024-10-14 RX ADMIN — Medication 1 PATCH: at 09:10

## 2024-10-14 RX ADMIN — HYDROXYZINE HYDROCHLORIDE 50 MG: 25 TABLET, FILM COATED ORAL at 09:10

## 2024-10-14 RX ADMIN — PANTOPRAZOLE SODIUM 40 MG: 40 INJECTION, POWDER, LYOPHILIZED, FOR SOLUTION INTRAVENOUS at 09:10

## 2024-10-14 RX ADMIN — LIDOCAINE HYDROCHLORIDE 100 MG: 20 INJECTION INTRAVENOUS at 03:10

## 2024-10-14 RX ADMIN — SUCRALFATE 1 G: 1 SUSPENSION ORAL at 02:10

## 2024-10-14 RX ADMIN — FENTANYL CITRATE 100 MCG: 50 INJECTION, SOLUTION INTRAMUSCULAR; INTRAVENOUS at 03:10

## 2024-10-14 RX ADMIN — MAGNESIUM SULFATE 1 G: 500 INJECTION, SOLUTION INTRAMUSCULAR; INTRAVENOUS at 05:10

## 2024-10-14 RX ADMIN — ACETAMINOPHEN 650 MG: 325 TABLET ORAL at 09:10

## 2024-10-14 RX ADMIN — SODIUM CHLORIDE, POTASSIUM CHLORIDE, SODIUM LACTATE AND CALCIUM CHLORIDE 500 ML: 600; 310; 30; 20 INJECTION, SOLUTION INTRAVENOUS at 02:10

## 2024-10-14 RX ADMIN — DROPERIDOL 0.62 MG: 2.5 INJECTION, SOLUTION INTRAMUSCULAR; INTRAVENOUS at 02:10

## 2024-10-14 RX ADMIN — CYCLOBENZAPRINE 10 MG: 10 TABLET, FILM COATED ORAL at 09:10

## 2024-10-14 RX ADMIN — PROPOFOL 150 MCG/KG/MIN: 10 INJECTION, EMULSION INTRAVENOUS at 03:10

## 2024-10-14 RX ADMIN — PROPOFOL 100 MG: 10 INJECTION, EMULSION INTRAVENOUS at 03:10

## 2024-10-14 RX ADMIN — LIDOCAINE 2 PATCH: 50 PATCH CUTANEOUS at 02:10

## 2024-10-14 RX ADMIN — SIMETHICONE 160 MG: 80 TABLET, CHEWABLE ORAL at 02:10

## 2024-10-14 RX ADMIN — POLYETHYLENE GLYCOL 3350, SODIUM SULFATE ANHYDROUS, SODIUM BICARBONATE, SODIUM CHLORIDE, POTASSIUM CHLORIDE 4000 ML: 236; 22.74; 6.74; 5.86; 2.97 POWDER, FOR SOLUTION ORAL at 06:10

## 2024-10-14 RX ADMIN — SUCRALFATE 1 G: 1 SUSPENSION ORAL at 06:10

## 2024-10-14 RX ADMIN — ACETAMINOPHEN 1000 MG: 500 TABLET ORAL at 03:10

## 2024-10-14 RX ADMIN — SUCRALFATE 1 G: 1 SUSPENSION ORAL at 12:10

## 2024-10-14 RX ADMIN — LORAZEPAM 1 MG: 1 TABLET ORAL at 01:10

## 2024-10-14 NOTE — HPI
39 y.o. male with a PMHx chronic back pain, nicotine dependence, and polysubstance abuse who presents to the ED with complaints of abdominal pain, rectal bleeding, and hematemesis that began 3 days ago. He was initially very constipated but it eventually progressed to diarrhea. He then noticed BRBPR and lower abdominal discomfort, then, yesterday he had an episode of coffee emesis. He denies any additional episodes since getting to the hospital. He reports that his abdominal pain is in the lower quadrants; it is not associated with eating. He has been taking ibuprofen heavily for the last 2 weeks. He denies alcohol use. He has never before had an EGD or colonoscopy.     On admission, his Hgb was 12.7 (last value from this past August was 15). He has remained HDS and not required any transfusions. CTA did not reveal any brisk bleeding, but did show colitis in the descending and sigmoid colon.

## 2024-10-14 NOTE — PATIENT INSTRUCTIONS
REFERRAL RECOMMENDATIONS FOR ALCOHOL USE DISORDER      12 STEP PROGRAMS (and similar):     Alcoholics Anonymous (local)  [x] [phone]  [x] www.[place].org for schedules for in-person and online meetings  [x] There are AA meetings throughout the day all over town  [x] AA costs nothing to attend; they pass a basket for donations but this is not required    Alcoholics Anonymous Online Intergroup (national)  [x] www.aa-intergroup.org  [x] Good resource for large, nation-wide meetings  [x] Can also attend smaller, local meetings in other cities  [x] Countless meetings all day and all night  [x] AA costs nothing to attend; they pass a basket for donations but this is not required    Flying Sober - 24/7 zoom meetings for women and coed - sign on anytime, anywhere!  [email or web address]    Online Intergroup of AA - 121 Open AA [place] Meeting - 24/7 zoom meetings  https://aa-intergroup.org/meetings/    LOOKING FOR AN ALTERNATIVE TO 12 STEP PROGRAMS - check out:  SMART Recovery: https://www.smartrecovery.org/about-us  [facility] Recovery: https://recoverydharma.org      DETOX UNITS (USUALLY 5-7 DAYS):     [facility]: [address], [place]  [phone], call first to ensure bed availability    [facility]: [address] [place]  [phone], Option 1, call first to ensure bed availability    [place] [facility] and Recovery Center: [facility] [address], [place]  [phone] (intake by appointment only)    [facility]: [address], [place]  [phone]      INTENSIVE OUTPATIENT PROGRAMS:     [facility] (formerly known as the ABU)  [x] [phone], Option 2  [x] [address], [address] 4th Floor, [place] [address]  [x] [email or web address].[email or web address]/services/[email or web address]-recovery-program  [x] [facility] delivers comprehensive and collaborative treatment for alcohol and substance use disorders.  Excellent program for working professionals or  anyone else seeking recovery.  [x] Requires insurance approval prior to starting program, call number above for more information.  [x] Intensive Outpatient Rehabilitation Program - M-F 9am-3pm - daily groups with psychologists and social workers, sessions with MDs 3x per week   [x] Ambulatory detox and dual diagnosis available    [facility] Intensive Outpatient Program  [x] [phone]  [x] [address] [facility] (the clinic not on [facility])  [x] Call number above for more info and to check insurance requirements    [facility]  [address] [place], [place] [address] [phone]    [facility]:  [address] [facility], [address]?, [place], [place] [address]?, [phone]  [address] [facility]?, [place], [place] [address]?, [phone]    RESIDENTIAL REHABS (USUALLY 28 DAYS):     [facility]: [address], [phone]    [facility] Recovery Center: [address] , [facility]  [phone] (intake by appointment only)    [facility] (men only) [address] [facility], [phone]    [facility] (Female only) [address] [facility], [phone]    [facility]: [address], [facility], men's program [phone], women's program [phone]    [facility]: [address], [facility], [phone]    [facility]: [address] [place], [place], [phone]    [facility]: Men only, [phone], [address] [place] [facility]: [address], [place], [place], [phone]    [facility]: [facility], [facility] [place], [place],  [phone]  New Location: [address] [place] [address] 100, [place] [address], [phone]    [facility]:   ?[address]?[place], [place] [address]?[phone]    [place]: [address], [place] [address], [phone]    [place]: [place], [phone]     [facility]: [place], [place], [phone]    [place]: [place]  BAMBI Mars, [phone]    [facility]: [place], [place], [phone]    [place]: [place], [place], [phone]    [facility] [place]: [address], [place] [address], [phone]    COMMUNITY ADDICTION CLINICS:     [facility]: [address] [facility], [address] [place], [phone] -or- [address] [place] [address]    [facility] [place]: [address] [facility], [place]  [address]     [facility]: Clinics [phone]; Crisis [phone]    [facility]: [address] [place] [address]    [facility]: [address] [place] [address]    [facility]: [address] [place] [address],    [facility]: 2nd Floor [address] [facility] [address]    [facility]: [address] [facility] [place] [address]    [facility], [address] [place] [address]    [facility]: [address] [place] [address], [place] [phone]  (serves youth 16-23 years old)    [facility]: [facility]/[place] [phone]    [facility]: [address] [facility], [place] [phone]    [facility]: [address] [place], [place] [phone]    [facility]: [address] [facility], [address], [phone]     [facility]: [address] [facility], [phone], [phone]    RESOURCES IN OTHER [facility]:     [facility]: [address] [place], [phone], [phone]    [facility]: [address] [facility], [address], [phone]    [facility], [address] [place] [address], [phone]    [facility]: [address], [place], [phone]    [facility]  "[facility] Health, [address] [facility], [phone] ([facility])    [facility], [address] [facility], [phone] ([facility])    [facility], [address], [address], [place], [phone] ([place] [place], [facility] [place] [facility])    [facility], [address], [place], [phone] ([facility])    [facility] Hotline [phone], [phone]    [facility], [address] [facility], [facility], [address] [facility], [address], [place] [facility], [address] [facility], [facility], [address] [facility] [address]., [place] [facility], [address], [facility], [address] [place] [address], [address] [phone]    [facility] Human [facility], [address] [facility] [phone]    [facility] Recovery, [address] [facility], [phone]    [facility] [place] [facility]. for Addiction Recovery, [address] [place] [address], [phone]      Fijian SPEAKING (en español):     Información de la reunión de Alcohólicos Anónimos  [facility], 10:00 am  Habla español  Esta reunión está abierta y cualquiera puede asistir.    Albanian speaking Alcoholics anonymous meetings:  El "Kemar [place] AA Skype" es un kemar on line de Alcohólicos Anónimos en español. El kemar es saba, gratuito y virtual a través de Skype Audio. El kemar funciona mediante clau llamada grupal de voz, por lo que no se utiliza la videollamada, ni se pueden rustam las imágenes o rostros de los participantes. Hace zack años y medio abrimos el primer Kemar de AA por [place] en [place], feliz actualmente asisten personas desde [place], [place], [place], [place], [place], [place], [place], [place], [place], [place], " [place] y [place], entre otros.    El mohsen es muy útil para los alcohólicos que residen en lugares donde no se celebran reuniones de AA, o residen en lugares donde las reuniones de AA son un número limitado de días a la semana, o para aquellos compañeros que se hayan de viaje o que, por cualquier motivo, se hayan convalecientes y no pueden desplazarse. Todos los días nos reuniones a las 21:00 (hora española)    Podéis obtener más información sobre el mohsen y brooks sesiones en la págLendMeYourLiteracy web [email or web address] [email or web address]      MENTAL HEALTH/ADDICTIVE DISORDERS:     AA ([phone]), NA ([phone])   National Suicide Prevention Lifeline- Call 1-382.402.2085 Available 24 hours [facility] 023-9263; Crisis Line 816-2873 - Call for options A-F:  Intensive Outpatient Treatment/ Day programs   [facility], please contact   [facility], please contact [phone] or [phone] to speak with an admissions counselor.  Behavioral Health Group (Methadone Maintenance)   [address] [place] [address], [phone]  [facility] [place] [address] [phone]  [facility], [address] [place] [address], [phone]  [facility] [phone]  [facility] [place] please contact [phone]  [facility], [facility], 4th floor BAMBI Mckenna [address] Phone: [phone]   Acer  [place] Office: [address] [address], [phone]  [place] Office: [address] [facility] [address], [place]ckenna [address], [phone]  [place] Office: [address] [facility] [place] [address] [phone]    Outpatient Substance Abuse Treatment   Behavioral Health Group (Methadone Maintenance)   [facility] [place] [address], [phone]  [address] [place] [address] [phone]  [facility], [address] [place] [address], [phone]  Acer  [place] Office: [address] [place] [address], [phone]  [place] Office: [address] [facility], [address], [place] [address], [phone]  [place] Office: [address] [place] [address] [phone]  [facility], [address] [place] [address] [phone]   [facility] for [facility] Recovery, [address] [facility], [address], [phone]  [facility] for [facility] Recover, [address] [place], [place] [phone]    Residential Substance Abuse Treatment   [facility] [address] [facility], [phone]  [facility], [address] [facility], [phone]  [facility] (men only) [address] [place] [address], [phone]  [facility] at the [facility] (women only) [address] [place] [address] [phone]  [facility], [address] [place] [address] [phone]  [facility] (women only), intakes at 4[address] [facility], [phone]  [facility] (7-day program, $100, [address] [place], [phone], [phone], [phone])  [facility] (Men only, [phone]), [address], Dave (Vets*/Non-Vets)  [facility] (Men only, $400/month program fee) [address] [facility] Renan Christopher, [phone]  [facility] (Women over age 39 only), [address] [facility], [phone]    Out of Area:    [place], [address] [place] [address] 36, [place], [place] [phone])  [facility] Recovery Program (men only), [address] [place] [address], [phone]  [facility], [address] [place], [place] ([phone])  [address] [place], [place] ([phone])  [facility], [address] [place] [address], [phone]  [facility] (Women only, has to have mental illness, can be homeless or substance abuser), [phone]      [place] RESOURCES:     [place] Mobile Mental Health Crisis Response  Team:    Region 12 ([place], [place], [place], and [facility]) ([facility])  [phone]      Outpatient Mental Health & Addiction Clinic Resources for both [facility]:    [facility] Mental Healthcare Resources  Website: www.[facility].org  Main Number: 146-051-7504    [facility] ([facility])  [address] ([address] [facility]) [place] [address]  [phone]    [facility] ([facility])  [address] ([address] [facility]) [place] [address]  [phone]    [facility]  [address] ([address]) [place] [address]  [phone]    [facility]  [address] ([address] [facility] [place]) [place] [address]  [phone]      Addiction Treatment Resources for both [facility]:    [facility] (Detox, Residential, PHP, IOP, and Aftercare Programs)  [address] [place], [place] [address] [phone]    [facility] (Residential, IOP, Transitional Living, and Aftercare Programs)  [address] [place] [address]  [phone]    [facility] (Inpatient, Residential, Detox, IOP, Outpatient, and Aftercare Programs)  [address] [place] [address] [phone] or toll free at [phone]      Outpatient Mental Health Psychotherapy Resources for both [facility]:    Malgorzata Blair, SAJIW  [address]  [place] [address]  [phone]  Specialties: Depression, Anxiety, and Life Transitions    Kiya Woodward, PhD  [address]  [place] [address]  [phone]  Specialties: Testing and Evaluation, Education and Learning Disabilities, and ADHD    Renetta Madrid, SAJIW [facility] [address] [place] [address]  [phone]  Specialties:  Obsessive-Compulsive (OCD), Depression, and Relationship Issues    Lily Mohr [facility] [address] [place] [address]  [phone]  Specialties: Trauma & PTSD, Mood Disorders, and Anxiety    Lily Burnett, PhD, [facility] [address] [place] [address], [place] [address] [phone]  Specialties: Family Conflict, Child, and Relationship Issues    Halima Leblanc LPC [facility] [place], [place] [address] [phone]  Specialties: Anxiety, Depression, and Anger Management        IN CASE OF SUICIDAL THINKING, call the National Suicide Hotline Number: 988    988 Suicide & Crisis Lifeline: 988 , 3-2245-191-327-TALK (8581)  Provides 24/7, free and confidential support for people in distress, prevention and crisis resources for you or your loved ones, and best practices for professionals.    Call, text or chat.  https://988Quellanline.org

## 2024-10-14 NOTE — H&P
[facility] - Emergency Dept  [facility] Medicine  History & Physical    Patient Name: Blu Hart  MRN: [id number]  Patient Class: OP- Observation  Admission Date: 10/13/2024  Attending Physician: Ravin Shanks DO   Primary Care Provider: Adelaida, Primary Doctor         Patient information was obtained from patient, past medical records, and ER records.     Subjective:     Principal Problem:GI bleed    Chief Complaint:   Chief Complaint   Patient presents with    Abdominal Pain     Generalized abdominal pain, rectal bleeding and coffee ground emesis x 2 days.  Last vomited 3 hours ago         HPI: Blu Hart is a 39 y.o. male with a PMHx chronic back pain, nicotine dependence, and polysubstance abuse who presents to the ED with complaints of abdominal pain, rectal bleeding, and hematemesis x2 days. The patient reports he initially had diarrhea and lower abdominal pain 3 days ago which he thought was due to having ice cream because he is lactose intolerant. He states his symptoms worsened the next day and he began having multiple episodes of bloody diarrhea, noting both bright red blood and darker maroon stools. He states later that night he had an episode of emesis that he describes as dark, coffee-ground and reports an additional episode of coffee-ground emesis a few hours prior to coming to the ED. He vomited in the ED as well but did not note any blood. He reports the abdominal pain is mostly lower and is cramping with intermittent stabbing pain. He also endorses associated nausea, diaphoresis, chills, and lightheadedness. He denies fever, CP, SOB, cough, syncope, or dysuria. He denies EtOH use but reports taking 800mg of ibuprofen 4-5x/daily for the past 2 weeks. He also endorses IVDU and typically uses heroin and fentanyl multiple times per day and last used this morning.    In the ED, patient initially hypertensive and tachycardic which improved with pain control otherwise vitals stable, afebrile. CBC  with stable anemia. Lipase 7. CMP unremarkable. Type & screen obtained. CTA abd/pelvis with findings suggestive of a nonspecific colitis involving the descending and rectosigmoid colon. No brisk active GI bleed. Hepatosplenomegaly. Age advanced degenerative changes in both hips. The patient received 4mg IV morphine, zofran 8mg x1, and protonix 80mg.    History reviewed. No pertinent past medical history.    History reviewed. No pertinent surgical history.    Review of patient's allergies indicates:  No Known Allergies    No current facility-administered medications on file prior to encounter.     Current Outpatient Medications on File Prior to Encounter   Medication Sig    ondansetron (ZOFRAN-ODT) 4 MG TbDL Take 1 tablet (4 mg total) by mouth every 6 (six) hours as needed (nausea).    polyethylene glycol (GLYCOLAX) 17 gram/dose powder Use to cap to measure 17g, mix with liquid, and take by mouth once daily.     Family History    None       Tobacco Use    Smoking status: Every Day     Current packs/day: 1.00     Types: Cigarettes    Smokeless tobacco: Never   Substance and Sexual Activity    Alcohol use: Never    Drug use: Yes     Types: Methamphetamines, Heroin     Comment: last used one week ago    Sexual activity: Not on file     Review of Systems   Constitutional:  Positive for chills, diaphoresis and fatigue. Negative for appetite change and fever.   HENT:  Negative for congestion, rhinorrhea and sore throat.    Eyes:  Negative for photophobia and visual disturbance.   Respiratory:  Negative for cough, shortness of breath and wheezing.    Cardiovascular:  Negative for chest pain, palpitations and leg swelling.   Gastrointestinal:  Positive for abdominal pain, blood in stool, diarrhea, nausea and vomiting. Negative for abdominal distention.   Genitourinary:  Negative for dysuria, frequency and hematuria.   Musculoskeletal:  Positive for arthralgias (chronic) and back pain (chronic). Negative for gait problem.    Skin:  Negative for color change, pallor, rash and wound.   Neurological:  Positive for light-headedness. Negative for syncope, weakness and headaches.   Psychiatric/Behavioral:  Negative for confusion and hallucinations. The patient is not nervous/anxious.      Objective:     Vital Signs (Most Recent):  Temp: 98.5 °F (36.9 °C) (10/14/24 0300)  Pulse: 81 (10/14/24 0200)  Resp: 18 (10/14/24 0300)  BP: 137/61 (10/14/24 0300)  SpO2: 95 % (10/14/24 0300) Vital Signs (24h Range):  Temp:  [98.2 °F (36.8 °C)-98.5 °F (36.9 °C)] 98.5 °F (36.9 °C)  Pulse:  [] 81  Resp:  [11-22] 18  SpO2:  [95 %-99 %] 95 %  BP: (123-204)/() 137/61     Weight: 117.9 kg (260 lb)  Body mass index is 36.26 kg/m².     Physical Exam  Vitals and nursing note reviewed.   Constitutional:       General: He is sleeping. He is not in acute distress.     Appearance: He is obese. He is diaphoretic. He is not toxic-appearing.   HENT:      Head: Normocephalic and atraumatic.      Nose: Nose normal.      Mouth/Throat:      Mouth: Mucous membranes are moist.   Eyes:      Pupils: Pupils are equal, round, and reactive to light.   Cardiovascular:      Rate and Rhythm: Normal rate.      Pulses: Normal pulses.   Pulmonary:      Effort: Pulmonary effort is normal. No respiratory distress.      Breath sounds: No wheezing, rhonchi or rales.   Abdominal:      General: Bowel sounds are normal. There is no distension.      Palpations: Abdomen is soft.      Tenderness: There is abdominal tenderness in the right lower quadrant and left lower quadrant. There is no guarding.   Musculoskeletal:         General: Normal range of motion.      Cervical back: Normal range of motion.      Right lower leg: No edema.      Left lower leg: No edema.   Skin:     General: Skin is warm.      Capillary Refill: Capillary refill takes less than 2 seconds.   Neurological:      General: No focal deficit present.      Mental Status: He is oriented to person, place, and time and  easily aroused.      Motor: No weakness.   Psychiatric:         Speech: Speech normal.         Behavior: Behavior is cooperative.              CRANIAL NERVES     CN III, IV, VI   Pupils are equal, round, and reactive to light.       Significant Labs: All pertinent labs within the past 24 hours have been reviewed.  CBC:   Recent Labs   Lab 10/13/24  2130   WBC 10.55   HGB 12.7*   HCT 39.1*        CMP:   Recent Labs   Lab 10/13/24  2130      K 3.9      CO2 26   GLU 97   BUN 6   CREATININE 0.8   CALCIUM 9.8   PROT 8.3   ALBUMIN 3.7   BILITOT 0.5   ALKPHOS 80   AST 13   ALT 7*   ANIONGAP 12       Significant Imaging: I have reviewed all pertinent imaging results/findings within the past 24 hours.  Imaging Results              CTA Acute GI [place], Abdomen and Pelvis (Final result)  Result time 10/13/24 23:52:41      Final result by Adal Miranda MD (10/13/24 23:52:41)                   Impression:      Findings suggestive of a nonspecific colitis involving the descending and rectosigmoid colon.  No brisk active GI bleed.  Consider endoscopy follow-up if there is persistent clinical concern.    Hepatosplenomegaly.    Age advanced degenerative changes in both hips.    Other findings discussed in the body of the report.      Electronically signed by: Adal Miranda MD  Date:    10/13/2024  Time:    23:52               Narrative:    EXAMINATION:  CTA ACUTE GI BLEED, ABDOMEN AND PELVIS    CLINICAL HISTORY:  Gi bleed;    TECHNIQUE:  CT images of the abdomen and pelvis were obtained prior to and after the IV administration of 100 mL of Omnipaque 350 .  Oral contrast was not given. Post-contrast images were obtained in the arterial and delayed phases per GI bleed protocol.  Axial, coronal, and sagittal reconstructions were created from the source data.    COMPARISON:  CT abdomen pelvis, 08/05/2024.    FINDINGS:  Lower Chest:    Lung bases are clear.  Heart size is normal.    Abdomen:    Liver is enlarged  and similar to the prior study.  No worrisome liver mass identified.  Subcentimeter hypodensity in the liver again noted, similar to prior CT.  Gallbladder is unremarkable. No intrahepatic biliary ductal dilatation.    Spleen is mildly enlarged.  Adrenal glands and pancreas are unremarkable.    The kidneys are symmetric.  No hydronephrosis. No asymmetric perinephric fat stranding.    Stomach is not overly distended.  No small bowel obstruction.  There is significant wall thickening of the descending and rectosigmoid colon from the splenic flexure to the rectum.  Hyperemia of the left hemicolon without brisk active intraluminal GI bleed identified.  Mild pericolonic fat stranding and minimal fluid in the left pericolic gutter.  Liquid stool in the colon and rectum.  Normal appendix.    No pneumoperitoneum or organized fluid collection.    No bulky retroperitoneal lymphadenopathy.    Abdominal aorta is normal in caliber without significant atherosclerosis.    Portal, splenic, and superior mesenteric veins are patent.  No portal venous gas.    Pelvis:    Urinary bladder and prostate are unremarkable.  Mild rectal wall thickening.  Liquid stool in the rectum.  No significant pelvic free fluid.    Bones and soft tissues:    No aggressive osseous lesions.  Degenerative changes in the spine.  Age advanced degenerative changes in both hips.  Extraperitoneal soft tissues are negative for acute finding.  Small fat containing umbilical hernia.                                      Assessment/Plan:     * GI bleed  Patient's hemorrhage is due to gastrointestinal bleed, patient does not have a propensity for bleeding.. Patients most recent Hgb, Hct, platelets, and INR are listed below.  Recent Labs     10/13/24  2130   HGB 12.7*   HCT 39.1*      -Afebrile, no leukocytosis.  -CTA abd/pelvis with findings suggestive of a nonspecific colitis involving the descending and rectosigmoid colon. No brisk active GI bleed.  Hepatosplenomegaly. Age advanced degenerative changes in both hips.    Plan  - Will trend hemoglobin/hematocrit Every 8 hours  - Will monitor and correct any coagulation defects  - Will transfuse if Hgb is <7g/dl (<8g/dl in cases of active ACS) or if patient has rapid bleeding leading to hemodynamic instability  - Started on GIB pathway.  - Keep patient NPO.  - Received 80mg IV Protonix in the ED, continue 40mg IV BID  - Consult GI, appreciate recs.  - Use IV anti-emetics as needed.   - Will add scheduled carafate and prn bentyl and simethicone.    Anemia  Anemia is likely due to acute blood loss which was from GIB vs colitis . Most recent hemoglobin and hematocrit are listed below.  Recent Labs     10/13/24  2130   HGB 12.7*   HCT 39.1*     Plan  - Monitor serial CBC: Every 8 hours  - Transfuse PRBC if patient becomes hemodynamically unstable, symptomatic or H/H drops below 7/21.  - Patient has not received any PRBC transfusions to date  - Patient's anemia is currently stable  - Anemia panel pending.    BMI 36.0-36.9,adult  Body mass index is 36.26 kg/m². Obesity complicates all aspects of disease management from diagnostic modalities to treatment.     Cigarette nicotine dependence without complication  Dangers of cigarette smoking were reviewed with patient in detail. Patient was Counseled for 3-10 minutes. Nicotine replacement options were discussed. Nicotine replacement was discussed- prescribed    Chronic back pain  -Chronic issue.  -PRN tylenol, lidocaine, and prn zanaflex.    Polysubstance abuse  Pt endorses IVDU, states he typically uses heroin and fentanyl multiple times per day.   -UDS pending.  -PRNs for opiate withdrawal.  -COWS score q4.  -Encouraged cessation from illicit drugs.  -Addiction psychiatry consulted, appreciate assistance. Patient reports interest in MAT.      VTE Risk Mitigation (From admission, onward)           Ordered     IP VTE HIGH RISK PATIENT  Once         10/14/24 0121     Place  sequential compression device  Until discontinued         10/14/24 0121                         On 10/14/2024, patient should be placed in hospital observation services under my care in collaboration with Morris Kennedy DO.           Tita Mcgee NP  Department of Hospital Medicine  [facility] - Emergency Dept           1 Principal Discharge DX:	Altered mental status  Secondary Diagnosis:	DVT, lower extremity

## 2024-10-14 NOTE — ASSESSMENT & PLAN NOTE
Patient's hemorrhage is due to gastrointestinal bleed, patient does not have a propensity for bleeding.. Patients most recent Hgb, Hct, platelets, and INR are listed below.  Recent Labs     10/13/24  2130 10/14/24  0153 10/14/24  0913   HGB 12.7*  --  11.6*   HCT 39.1*  --  35.7*     --  253   INR  --  1.0  --    -Afebrile, no leukocytosis.  -CTA abd/pelvis with findings suggestive of a nonspecific colitis involving the descending and rectosigmoid colon. No brisk active GI bleed. Hepatosplenomegaly. Age advanced degenerative changes in both hips.    Plan  - Will trend hemoglobin/hematocrit qd  - Will monitor and correct any coagulation defects  - Will transfuse if Hgb is <7g/dl (<8g/dl in cases of active ACS) or if patient has rapid bleeding leading to hemodynamic instability  - Started on GIB pathway.  - Use IV anti-emetics as needed.   - plan for EGD today with GI, further treatment pending results

## 2024-10-14 NOTE — CONSULTS
"  [facility]   DEPARTMENT OF PSYCHIATRY     IDENTIFIERS & DEMOGRAPHICS:     ENCOUNTER: initial    -- PATIENT IDENTIFIERS: Blu Hart  [id number]  1985  39 y.o.  male  -- LOCATION OF PATIENT: ED  admitted for observation  -- MODE OF ARRIVAL: self-presented  -- PRESENT WITH PATIENT DURING SESSION: partner  -- SOURCES OF INFORMATION: PATIENT, partner  -- ENCOUNTER PROVIDER: Adal Polanco IV, MD        PRESENTATION:     CHIEF COMPLAINT(S): opioid use disorder and withdrawal    OVERVIEW OF THE HPI:    39M with hx polysubstance abuse, chronic back pain presented with suspected GI bleed likely due to NSAID use. Addiction consulted for heroin and fentanyl use.     SUBJECTIVE/CURRENT FINDINGS:    Patient drowsy but awakes to voice. Falls asleep multiple times. Some history per wife at bedside. Patient affirms reason for admission and is unsure of upcoming hospital course. States he will be scoped at time unknown to him. Reports he is interested in rehab at some point in the future; undecided if he would like to go on discharge as he is currently focused on the suspected GI bleed. Notes ten years of daily heroin +/- known fentanyl use, usually IV, often multiple times daily. Wife quantifies as 1-1.5g daily. Now interested in quitting. Reports prior bad experience with suboxone with GI symptoms; unable to clarify if taken correctly vs prematurely. States he is presently hesitant but not opposed to this. Wife states he would be more interested in methadone based on their prior discussions as she has been on methadone and they are familiar with it. Reports symptoms of anxiety, restlessness, diaphoresis. Feels he is anxious at baseline although cannot delineate in setting of daily substance use for ten years. Feels he has been depressed in the past, but also in setting of substance use.     Per Chart Review:    Primary HPI:   "Blu Hart is a 39 y.o. male with a PMHx chronic back pain, nicotine dependence, " "and polysubstance abuse who presents to the ED with complaints of abdominal pain, rectal bleeding, and hematemesis x2 days. The patient reports he initially had diarrhea and lower abdominal pain 3 days ago which he thought was due to having ice cream because he is lactose intolerant. He states his symptoms worsened the next day and he began having multiple episodes of bloody diarrhea, noting both bright red blood and darker maroon stools. He states later that night he had an episode of emesis that he describes as dark, coffee-ground and reports an additional episode of coffee-ground emesis a few hours prior to coming to the ED. He vomited in the ED as well but did not note any blood. He reports the abdominal pain is mostly lower and is cramping with intermittent stabbing pain. He also endorses associated nausea, diaphoresis, chills, and lightheadedness. He denies fever, CP, SOB, cough, syncope, or dysuria. He denies EtOH use but reports taking 800mg of ibuprofen 4-5x/daily for the past 2 weeks. He also endorses IVDU and typically uses heroin and fentanyl multiple times per day and last used this morning."    REVIEW OF SYSTEMS:    >> SOURCES: patient     N   Sleep Disturbance/Disruption   N   Appetite/Weight Change   N   Alterations in Energy Level   N   Impaired Focus/Concentration   Y   Depressive Symptomatology   Y   Excessive Anxiety/Worry   N   Dysregulated Mood/Behavior   N   Manic Symptomatology   N   Psychosis   Y   Trauma-Related  +recurrent nightmares      Regarding the current presentation, no other significant issues or complaints are voiced or known at this time.       ADD-ON PSYCHOTHERAPY:     ADD-ON THERAPY     HISTORY:     >> SOURCES: patient, collateral       PSYCH  SUBSTANCE  FAMILY  SOCIAL  MEDICAL     Y   Previous/Pre-Existing Psychiatric Diagnoses  "PTSD" via rehab psychiatrist   Y   Past Psychotropic Trials  zoloft   N   Current " "Psychiatric Provider   N   Hx of Outpatient Psychiatric Treatment   N   Hx of Psychiatric Hospitalization   N   Hx of Suicidal Ideation/Threats   N   Hx of Suicide Attempts/Gestures   N   Hx of Homicidal Ideation/Threats   N   Hx of Homicidal Behavior   N   Hx of Non-Suicidal Self-Injurious Behavior   N   Hx of Perpetrated Violence   N   Documented Hx of Malingering   N   Hx of Psychosis   N   Hx of Bipolar Diathesis   Y   Hx of Depression  unconfirmed/unsubstantiated   N   Hx of Anxiety   N   Hx of Insomnia   N   Hx of Delirium     Y   Hx of Formal CODY Treatment   Y   Hx of Detox   Y   Hx of Rehab   N   Recent Alcohol Consumption   Y   Hx of Nicotine Use   N   Hx of Alcohol Misuse/Abuse   Y   Hx of Illicit Drug Misuse/Abuse   N   Hx of Prescription Drug Misuse/Abuse   +   Drug Experimentation/Usage  +heroin, +fentanyl        Y   Hx of Trauma     N   Currently Employed  "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     EXAMINATION:     VITALS:  /68   Pulse 72   Temp 97.9 °F (36.6 °C) (Oral)   Resp 12   Ht 5' 11" (1.803 m)   Wt 117.9 kg (260 lb)   SpO2 97%   BMI 36.26 kg/m²      MENTAL STATUS EXAMINATION:  Appearance: appropriately dressed, adequately groomed, in no apparent distress    heavily tattooed  Behavior & Attitude: participative, under adequate behavioral control  calm, agreeable, cooperative    falling " "asleep  Movements & Motor Activity: no psychomotor agitation, no psychomotor retardation, no tremor    Speech & Language: normal rate, normal volume, normal quantity, spontaneous, reciprocal, fluent    Mood: "tired"  Affect: euthymic, reactive, full range  appropriate given the situation/context    Thought Process & Associations: linear, goal-directed, organized, logical, coherent    Thought Content & Perceptions: no delusions, no paranoid ideation, no hallucinations    Sensorium: awake, alert, clear  no confusion    Orientation: fully intact    Recent & Remote Memory: intact (recent), intact (remote)    Attention & Concentration: attentive to conversation, not easily distracted    falling asleep  Fund of Knowledge: intact, vocabulary proficient    Insight: intact    help seeking  Judgment: partial    help seeking, though not decided on treatment        DSM-5-TR SUBSTANCE USE DISORDER CRITERIA:     -- Impaired Control:  I[x]I Y  I[]I N  I[]I U  I[]I A  I[]I D  Often take in larger amounts or over a longer period of time than was intended:   I[x]I Y  I[]I N  I[]I U  I[]I A  I[]I D  Persistent desire or unsuccessful efforts to cut down or control use:   I[x]I Y  I[]I N  I[]I U  I[]I A  I[]I D  Great deal of time spent in activities necessary to obtain substance, use, or recover from effects:   I[]I Y  I[]I N  I[]I U  I[]I A  I[]I D  Craving/strong desire for substance or urge to use:   -- Social Impairment:  I[]I Y  I[]I N  I[]I U  I[]I A  I[]I D  Use resulting in failure to fulfill major role obligations at home, work or school:   I[x]I Y  I[]I N  I[]I U  I[]I A  I[]I D  Social, occupational, recreational activities decreased because of use:   I[]I Y  I[]I N  I[]I U  I[]I A  I[]I D  Continued use despite having persistent or recurrent social or interpersonal problems caused or exacerbated by the substance:   -- Risky Use:  I[]I Y  I[]I N  I[]I U  I[]I A  I[]I D  Recurrent use in situations in which " it is physically hazardous:   I[x]I Y  I[]I N  I[]I U  I[]I A  I[]I D  Use despite physical or psychological problems that are likely to have been caused or exacerbated by the substance:   -- Neuroadaptation:  I[x]I Y  I[]I N  I[]I U  I[]I A  I[]I D  Tolerance, as defined by either of the following: (1) a need for markedly increased amounts of substance to achieve intoxication or desired effect.  -OR- (2) a markedly diminished effect with continued use of the same amount of substance:   I[x]I Y  I[]I N  I[]I U  I[]I A  I[]I D  Withdrawal, as manifested by either of the following: (1) the characteristic withdrawal syndrome for substance.  -OR- (2) substance is taken to relieve or avoid withdrawal symptoms:   -- Mild (1-3), Moderate (4-5), Severe (>=6)    I[]I N/A  I[]I Y  I[]I N  I[]I U  I[]I A  I[]I D  Active Substance Use Disorder:         RISK & REGULATORY:      RISK PARAMETERS (current to the encounter/episode  NOT inclusive of past history):     N   Suicidal Ideation/Threats   N   Suicide Attempts/Gestures   N   Homicidal Ideation/Threats   N   Homicidal Behavior   N   Non-Suicidal Self-Injurious Behavior   N   Perpetrated Violence     FIREARMS & WEAPONS:     N   Ready Access to Firearms     SAFETY SCREENINGS:    -- PROTECTIVE FACTORS: IDENTIFIED       - SPECIFIC FACTORS IDENTIFIED: fear of bad outcome, accessible support, impact on others    -- RISK FACTORS: IDENTIFIED     - SPECIFIC MODIFIABLE FACTORS IDENTIFIED: intoxication/use, substance abuse    -- FUTURE ORIENTED: YES    -- CAREGIVER(S)     - SUPPORTIVE & APPROPRIATELY INVOLVED: YES     REGULATORY:    -- : REVIEWED  no discrepancies or irregularities are noted      INFORMED CONSENT & SHARED DECISION MAKING are the hallmark and bedrock of good clinical care, and as such have been employed and obtained, respectively, to the degree possible.  Discussed, to the extent possible, diagnosis, risks and benefits of proposed  treatment (e.g., medication, therapy) vs alternative treatments vs no treatment, potential side effects of these treatments, and the inherent unpredictability of treatment.  Resources have been provided via the patient instructions in the AVS to supplement, augment, and reinforce discussions, counseling, and/or interventions.       - ABILITY TO UNDERSTAND, PARTICIPATE & ENGAGE: adequate     - AGREEABLE TO TREATMENT (consent/assent): the patient consents to treatment     - RELIABILITY/ACCURACY: the patient is deemed to be a historian of unknown reliability and accuracy      WARNINGS & PRECAUTIONS:  >> In cases of emergencies (e.g. SI/HI resulting in danger to self or others, functioning deteriorating to the level of grave disability), call 911 or [phone], or present to the emergency department for immediate assistance.    >> Individuals should not operate a motor vehicle or heavy machinery if effects of medications or underlying symptoms/condition impair the ability to do so safely.    >> FULLY comply with ANY/ALL medication as prescribed/instructed and report ANY/ALL suspected adverse effects to appropriate health care providers.       ASSESSMENT & PLAN:     DIAGNOSES & PROBLEMS:       1.  Opioid withdrawal  acute with systemic symptoms    2.  Opioid use disorder, severe  chronic with exacerbation/progression    PSYCHOTROPIC REGIMEN:   (C)=Continue as prescribed  (A)=Adjust as noted  (I)=Iniitate  (D)=Discontinue      1.  Opiate withdrawal PRNs (C)    -- ASSESSMENT (synthesis  analysis):     39M with hx polysubstance use including heroin, fentanyl, THC presenting with GI bleed. Addiction is consulted for substance use. Patient appears in early detox, would expect it to draw out longer due to fentanyl use. Will continue to discuss MAT on follow up with more clarity on hospital course and patient decision.      - GLOBAL FUNCTIONING: below baseline/modestly so    -- PLAN (goals  recommentations):        With reasonable  medical certainty, based on history, chart review, available collateral information, and a present-state examination:  - PEC is not indicated  - options for medication-assisted treatment (MAT) for opioid use disorder were discussed  -- RECOMMENDATIONS STATUS POST DETOX: establish and maintain sobriety and a recovery lifestyle, complete a licensed accredited rehab program, and engage in 12 step (or equivalent) meetings and activities  - recommend patient enroll in addiction rehab program after discharge and medical stabilization  - counseled on full abstinence from alcohol and substances of abuse (illicit and prescription)  - relapse prevention and motivational interviewing provided  - provided resources for various addiction rehabilitation options as part of aftercare planning     Opiate Withdrawal Protocol:              -Clonidine 0.1mg PO q4hr PRN for withdrawal associated HTN              -Bentyl 10mg PO q6hr PRN for abdominal muscle cramps              -Loperamide 2mg PO q6hr PRN for diarrhea              -Robaxin 500mg PO q6hr PRN for muscle spasm              -Zofran 4mg SL q8hr PRN for nausea    - Will follow up with patient tomorrow as he is undecided on any MAT and appears drowsy, likely due to detox.      MEDICAL DECISION MAKING:    - DIAGNOSTICS: a diagnostic psychiatric evaluation was performed and responsiveness to treatment was assessed  ability/capacity to respond to treatment: adequate/intact    CHART REVIEW: available documentation has been reviewed, and pertinent elements of the chart have been incorporated into this evaluation where appropriate.       DIAGNOSTIC TESTING:      Glu 106  10/14/2024  Li *   *  TSH 0.65  12/17/2023    HgA1c *   *  VPA *   *   FT4 1.76  12/17/2023    Na 138  10/14/2024  CLZ *   *  WBC 9.86  10/14/2024    Cr 0.7  10/14/2024  ANC 6.6; 67.2;   10/14/2024   Hgb 11.6 (L)  10/14/2024     BUN 6  10/14/2024  Trop I *   *  HCT 35.7 (L)  10/14/2024     GFR >60.0   10/14/2024     3/26/2023    10/14/2024     Alb 3.4 (L)  10/14/2024   PRL *   *  B12 350  10/14/2024     T Bili 0.5  10/14/2024  Chol 150  7/19/2023  B9 6.7  10/14/2024    ALP 74  10/14/2024  TGs 105  7/19/2023  B1 *   *    AST 11  10/14/2024  HDL 39 (L)  7/19/2023  Vit D *   *     ALT 9 (L)  10/14/2024  LDL 90  7/19/2023  HIV Non-reactive  8/5/2024     INR 1.0  10/14/2024  Missy *   *   Hep C Non-reactive  8/5/2024    GGT *   *  Lip 7  10/13/2024  RPR *   *    MCV 85  10/14/2024   NH4 *   *  UPT *   *      PETH *   *  THC Presumptive Positive (A)  10/14/2024    ETOH *   *  KETTY Negative  10/14/2024    EtG *   *  AMP Negative  10/14/2024    ALC <10  10/14/2024  OPI Presumptive Positive (A)  10/14/2024    BZO Negative  10/14/2024  MTD Negative  10/14/2024     BAR Negative  10/14/2024  BUP *   *    PCP Negative  10/14/2024  FEN Presumptive Positive (A)  10/14/2024     Results for orders placed or performed during the hospital encounter of 10/13/24   EKG 12-lead    Collection Time: 10/13/24  8:53 PM   Result Value Ref Range    QRS Duration 86 ms    [facility] QTC Calculation 426 ms    Narrative    Test Reason : K92.2,    Vent. Rate : 075 BPM     Atrial Rate : 075 BPM     P-R Int : 150 ms          QRS Dur : 086 ms      QT Int : 382 ms       P-R-T Axes : 054 066 060 degrees     QTc Int : 426 ms    Normal sinus rhythm  Normal ECG  When compared with ECG of 05-AUG-2024 02:05,  No significant change was found  Confirmed by Roel Jackman MD ([id number]) on 10/14/2024 7:48:06 AM    Referred By: [email or web address]   SELF           Confirmed By:Roel Jackman MD        ALCOCER & LINKS:        Y  = yes/endorses     N  = no/denies     U  = unknown/unable to assess    ADHD   AIMS   AUDIT   AUDIT-C   C-SSRS (Screen)   C-SSRS (Short)   C-SSRS (Full)   DAST   DAST-10   HEIDI-7   MoCA   PCL-5   PHQ-9   CODY   YMRS     Inpatient consult to Psychiatry  Consult performed by: Adal Polanco IV, MD  Consult  ordered by: Tita Mcgee, CR      [facility] EMERGENCY DEPARTMENT

## 2024-10-14 NOTE — CONSULTS
[facility] - Emergency Dept  Gastroenterology  Consult Note    Patient Name: Blu Hart  MRN: [id number]  Admission Date: 10/13/2024  Hospital Length of Stay: 0 days  Code Status: Full Code   Attending Provider: Ravin Shanks DO   Consulting Provider: Daryl Epps DO  Primary Care Physician: No, Primary Doctor  Principal Problem:GI bleed    Inpatient consult to Gastroenterology  Consult performed by: Daryl Epps DO  Consult ordered by: Tita Mcgee NP        Subjective:     HPI:  39 y.o. male with a PMHx chronic back pain, nicotine dependence, and polysubstance abuse who presents to the ED with complaints of abdominal pain, rectal bleeding, and hematemesis that began 3 days ago. He was initially very constipated but it eventually progressed to diarrhea. He then noticed BRBPR and lower abdominal discomfort, then, yesterday he had an episode of coffee emesis. He denies any additional episodes since getting to the hospital. He reports that his abdominal pain is in the lower quadrants; it is not associated with eating. He has been taking ibuprofen heavily for the last 2 weeks. He denies alcohol use. He has never before had an EGD or colonoscopy.     On admission, his Hgb was 12.7 (last value from this past August was 15). He has remained HDS and not required any transfusions. CTA did not reveal any brisk bleeding, but did show colitis in the descending and sigmoid colon.     History reviewed. No pertinent past medical history.    History reviewed. No pertinent surgical history.    Review of patient's allergies indicates:  No Known Allergies  Family History    None       Tobacco Use    Smoking status: Every Day     Current packs/day: 1.00     Types: Cigarettes    Smokeless tobacco: Never   Substance and Sexual Activity    Alcohol use: Never    Drug use: Yes     Types: Methamphetamines, Heroin, Fentanyl     Comment: last used one week ago    Sexual activity: Not on file     Review of Systems   Constitutional:   Positive for fatigue. Negative for chills and fever.   Respiratory:  Negative for shortness of breath.    Cardiovascular:  Negative for chest pain.   Gastrointestinal:  Positive for abdominal distention, abdominal pain, blood in stool, constipation, diarrhea and vomiting. Negative for nausea.   Genitourinary:  Negative for dysuria.   Neurological:  Positive for weakness. Negative for light-headedness.     Objective:     Vital Signs (Most Recent):  Temp: 97.9 °F (36.6 °C) (10/14/24 0700)  Pulse: 74 (10/14/24 0730)  Resp: 16 (10/14/24 0730)  BP: (!) 93/53 (10/14/24 0730)  SpO2: 96 % (10/14/24 0730) Vital Signs (24h Range):  Temp:  [97.9 °F (36.6 °C)-98.5 °F (36.9 °C)] 97.9 °F (36.6 °C)  Pulse:  [] 74  Resp:  [10-22] 16  SpO2:  [95 %-99 %] 96 %  BP: ()/() 93/53     Weight: 117.9 kg (260 lb) (10/13/24 2105)  Body mass index is 36.26 kg/m².      Intake/Output Summary (Last 24 hours) at 10/14/2024 0805  Last data filed at 10/14/2024 0713  Gross per 24 hour   Intake 670.92 ml   Output --   Net 670.92 ml       Lines/Drains/Airways       Peripheral Intravenous Line  Duration                  Peripheral IV - Single Lumen 10/13/24 2244 20 G Left Upper Arm <1 day                     Physical Exam  Constitutional:       General: He is not in acute distress.     Appearance: He is obese. He is ill-appearing.   HENT:      Head: Normocephalic and atraumatic.      Mouth/Throat:      Pharynx: Oropharynx is clear.   Eyes:      General: No scleral icterus.  Cardiovascular:      Rate and Rhythm: Normal rate.      Pulses: Normal pulses.   Pulmonary:      Effort: Pulmonary effort is normal. No respiratory distress.   Abdominal:      General: There is distension.      Palpations: Abdomen is soft.      Tenderness: There is abdominal tenderness (lower quadrants). There is no guarding or rebound.   Musculoskeletal:         General: No deformity.   Skin:     General: Skin is warm and dry.   Neurological:      Mental Status: He  is alert and oriented to person, place, and time.   Psychiatric:         Mood and Affect: Mood normal.         Behavior: Behavior normal.          Significant Labs:  CBC:   Recent Labs   Lab 10/13/24  2130   WBC 10.55   HGB 12.7*   HCT 39.1*        CMP:   Recent Labs   Lab 10/14/24  0153      CALCIUM 9.4   ALBUMIN 3.4*   PROT 7.7      K 3.6   CO2 25      BUN 6   CREATININE 0.7   ALKPHOS 74   ALT 9*   AST 11   BILITOT 0.5     All pertinent lab results from the last 24 hours have been reviewed.    Significant Imaging:  Imaging results within the past 24 hours have been reviewed.  Assessment/Plan:     GI  * GI bleed  39 yoM with pmh of polysubstance abuse for whom GI was consulted for BRBPR and coffee ground emesis. He was initially constipated but ir progressed to diarrhea; concerning for overflow. He then began to have BRBPR and lower abdominal pain. Then, on the day of admission, he also had an episode of coffee ground emesis. Hgb was 12.7 on admit, prior value from August was 15. CTA negative for brisk bleed but did show evidence of colitis in the descending and sigmoid colon. Since admission, he has not had any additional episodes of bleeding. He has remained HDS. He has never had a EGD or colonoscopy.    - Plan for EGD today while inpatient and outpatient colonoscopy.  - Trend Hgb q12hrs. Transfuse for Hgb > 7, unless otherwise indicated  - Bolus IV pantoprazole 80mg followed by 40mg BID  - Maintain IV access with 2 large bore Ivs  - Intravascular resuscitation/support with IVFs   - Keep NPO  - Hold all NSAIDs and anticoagulants, unless contraindicated  - Please correct any coagulopathy with platelets and FFP for goal of platelets >50K and INR <2.0  - Please notify GI team if there is significant change in patient's clinical status                Thank you for your consult. I will follow-up with patient. Please contact us if you have any additional questions.    Daryl Epps,  DO  Gastroenterology  [facility] - Emergency Dept

## 2024-10-14 NOTE — H&P (VIEW-ONLY)
[facility] Dept  Gastroenterology  Consult Note    Patient Name: Blu Hart  MRN: [id number]  Admission Date: 10/13/2024  Hospital Length of Stay: 0 days  Code Status: Full Code   Attending Provider: Ravin Shanks DO   Consulting Provider: Daryl Epps DO  Primary Care Physician: No, Primary Doctor  Principal Problem:GI bleed    Inpatient consult to Gastroenterology  Consult performed by: Daryl Epps DO  Consult ordered by: Tita Mcgee NP        Subjective:     HPI:  39 y.o. male with a PMHx chronic back pain, nicotine dependence, and polysubstance abuse who presents to the ED with complaints of abdominal pain, rectal bleeding, and hematemesis that began 3 days ago. He was initially very constipated but it eventually progressed to diarrhea. He then noticed BRBPR and lower abdominal discomfort, then, yesterday he had an episode of coffee emesis. He denies any additional episodes since getting to the hospital. He reports that his abdominal pain is in the lower quadrants; it is not associated with eating. He has been taking ibuprofen heavily for the last 2 weeks. He denies alcohol use. He has never before had an EGD or colonoscopy.     On admission, his Hgb was 12.7 (last value from this past August was 15). He has remained HDS and not required any transfusions. CTA did not reveal any brisk bleeding, but did show colitis in the descending and sigmoid colon.     History reviewed. No pertinent past medical history.    History reviewed. No pertinent surgical history.    Review of patient's allergies indicates:  No Known Allergies  Family History    None       Tobacco Use    Smoking status: Every Day     Current packs/day: 1.00     Types: Cigarettes    Smokeless tobacco: Never   Substance and Sexual Activity    Alcohol use: Never    Drug use: Yes     Types: Methamphetamines, Heroin, Fentanyl     Comment: last used one week ago    Sexual activity: Not on file     Review of Systems   Constitutional:   Positive for fatigue. Negative for chills and fever.   Respiratory:  Negative for shortness of breath.    Cardiovascular:  Negative for chest pain.   Gastrointestinal:  Positive for abdominal distention, abdominal pain, blood in stool, constipation, diarrhea and vomiting. Negative for nausea.   Genitourinary:  Negative for dysuria.   Neurological:  Positive for weakness. Negative for light-headedness.     Objective:     Vital Signs (Most Recent):  Temp: 97.9 °F (36.6 °C) (10/14/24 0700)  Pulse: 74 (10/14/24 0730)  Resp: 16 (10/14/24 0730)  BP: (!) 93/53 (10/14/24 0730)  SpO2: 96 % (10/14/24 0730) Vital Signs (24h Range):  Temp:  [97.9 °F (36.6 °C)-98.5 °F (36.9 °C)] 97.9 °F (36.6 °C)  Pulse:  [] 74  Resp:  [10-22] 16  SpO2:  [95 %-99 %] 96 %  BP: ()/() 93/53     Weight: 117.9 kg (260 lb) (10/13/24 2105)  Body mass index is 36.26 kg/m².      Intake/Output Summary (Last 24 hours) at 10/14/2024 0805  Last data filed at 10/14/2024 0713  Gross per 24 hour   Intake 670.92 ml   Output --   Net 670.92 ml       Lines/Drains/Airways       Peripheral Intravenous Line  Duration                  Peripheral IV - Single Lumen 10/13/24 2244 20 G Left Upper Arm <1 day                     Physical Exam  Constitutional:       General: He is not in acute distress.     Appearance: He is obese. He is ill-appearing.   HENT:      Head: Normocephalic and atraumatic.      Mouth/Throat:      Pharynx: Oropharynx is clear.   Eyes:      General: No scleral icterus.  Cardiovascular:      Rate and Rhythm: Normal rate.      Pulses: Normal pulses.   Pulmonary:      Effort: Pulmonary effort is normal. No respiratory distress.   Abdominal:      General: There is distension.      Palpations: Abdomen is soft.      Tenderness: There is abdominal tenderness (lower quadrants). There is no guarding or rebound.   Musculoskeletal:         General: No deformity.   Skin:     General: Skin is warm and dry.   Neurological:      Mental Status: He  is alert and oriented to person, place, and time.   Psychiatric:         Mood and Affect: Mood normal.         Behavior: Behavior normal.          Significant Labs:  CBC:   Recent Labs   Lab 10/13/24  2130   WBC 10.55   HGB 12.7*   HCT 39.1*        CMP:   Recent Labs   Lab 10/14/24  0153      CALCIUM 9.4   ALBUMIN 3.4*   PROT 7.7      K 3.6   CO2 25      BUN 6   CREATININE 0.7   ALKPHOS 74   ALT 9*   AST 11   BILITOT 0.5     All pertinent lab results from the last 24 hours have been reviewed.    Significant Imaging:  Imaging results within the past 24 hours have been reviewed.  Assessment/Plan:     GI  * GI bleed  39 yoM with pmh of polysubstance abuse for whom GI was consulted for BRBPR and coffee ground emesis. He was initially constipated but ir progressed to diarrhea; concerning for overflow. He then began to have BRBPR and lower abdominal pain. Then, on the day of admission, he also had an episode of coffee ground emesis. Hgb was 12.7 on admit, prior value from August was 15. CTA negative for brisk bleed but did show evidence of colitis in the descending and sigmoid colon. Since admission, he has not had any additional episodes of bleeding. He has remained HDS. He has never had a EGD or colonoscopy.    - Plan for EGD today while inpatient and outpatient colonoscopy.  - Trend Hgb q12hrs. Transfuse for Hgb > 7, unless otherwise indicated  - Bolus IV pantoprazole 80mg followed by 40mg BID  - Maintain IV access with 2 large bore Ivs  - Intravascular resuscitation/support with IVFs   - Keep NPO  - Hold all NSAIDs and anticoagulants, unless contraindicated  - Please correct any coagulopathy with platelets and FFP for goal of platelets >50K and INR <2.0  - Please notify GI team if there is significant change in patient's clinical status                Thank you for your consult. I will follow-up with patient. Please contact us if you have any additional questions.    Daryl Epps,  DO  Gastroenterology  [facility]

## 2024-10-14 NOTE — HOSPITAL COURSE
Patient went with GI for EGD without findings. Colonoscopy showed area of ischemia and concern for infection in the area due to bleeding and mucosal breakdown. Pathology negative for malignancy/neoplasia. He was started on IV Zosyn and trasnitioned to PO Augmentin per  GI recommendations.  After discussion with patient and addiction team, plan to start suboxone but patient refused, saying it didn't work in the past. Discussions were had about possible initiation of Subtutex, but decision to hold off at this time. Plans made with Case management for patient to be provided with PCP and arrangements made for Addiction Medicine outpatient follow up. He was given medications for symptomatic withdrawal with plans for follow up by PCP.     Pt deemed appropriate for discharge; seen and examined prior to departure. Plan discussed with pt, who was agreeable and amenable; medications were discussed and reviewed, outpatient follow-up scheduled, ER precautions were given, all questions were answered to the pt's satisfaction, and he was subsequently discharged.

## 2024-10-14 NOTE — ASSESSMENT & PLAN NOTE
Patient's hemorrhage is due to gastrointestinal bleed, patient does not have a propensity for bleeding.. Patients most recent Hgb, Hct, platelets, and INR are listed below.  Recent Labs     10/13/24  2130   HGB 12.7*   HCT 39.1*      -Afebrile, no leukocytosis.  -CTA abd/pelvis with findings suggestive of a nonspecific colitis involving the descending and rectosigmoid colon. No brisk active GI bleed. Hepatosplenomegaly. Age advanced degenerative changes in both hips.    Plan  - Will trend hemoglobin/hematocrit Every 8 hours  - Will monitor and correct any coagulation defects  - Will transfuse if Hgb is <7g/dl (<8g/dl in cases of active ACS) or if patient has rapid bleeding leading to hemodynamic instability  - Started on GIB pathway.  - Keep patient NPO.  - Received 80mg IV Protonix in the ED, continue 40mg IV BID  - Consult GI, appreciate recs.  - Use IV anti-emetics as needed.   - Will add scheduled carafate and prn bentyl and simethicone.

## 2024-10-14 NOTE — PROVATION PATIENT INSTRUCTIONS
Discharge Summary/Instructions after an Endoscopic Procedure  Patient Name: Blu Hart  Patient MRN: [id number]  Patient YOB: 1985  Monday, October 14, 2024  Tez Velasquez MD  Dear patient,  As a result of recent federal legislation (The Federal Cures Act), you may   receive lab or pathology results from your procedure in your [facility]   account before your physician is able to contact you. Your physician or   their representative will relay the results to you with their   recommendations at their soonest availability.  Thank you,  RESTRICTIONS:  During your procedure today, you received medications for sedation.  These   medications may affect your judgment, balance and coordination.  Therefore,   for 24 hours, you have the following restrictions:   - DO NOT drive a car, operate machinery, make legal/financial decisions,   sign important papers or drink alcohol.    ACTIVITY:  Today: no heavy lifting, straining or running due to procedural   sedation/anesthesia.  The following day: return to full activity including work.  DIET:  Eat and drink normally unless instructed otherwise.     TREATMENT FOR COMMON SIDE EFFECTS:  - Mild abdominal pain, nausea, belching, bloating or excessive gas:  rest,   eat lightly and use a heating pad.  - Sore Throat: treat with throat lozenges and/or gargle with warm salt   water.  - Because air was used during the procedure, expelling large amounts of air   from your rectum or belching is normal.  - If a bowel prep was taken, you may not have a bowel movement for 1-3 days.    This is normal.  SYMPTOMS TO WATCH FOR AND REPORT TO YOUR PHYSICIAN:  1. Abdominal pain or bloating, other than gas cramps.  2. Chest pain.  3. Back pain.  4. Signs of infection such as: chills or fever occurring within 24 hours   after the procedure.  5. Rectal bleeding, which would show as bright red, maroon, or black stools.   (A tablespoon of blood from the rectum is not serious, especially  if   hemorrhoids are present.)  6. Vomiting.  7. Weakness or dizziness.  GO DIRECTLY TO THE NEAREST EMERGENCY ROOM IF YOU HAVE ANY OF THE FOLLOWING:      Difficulty breathing              Chills and/or fever over 101 F   Persistent vomiting and/or vomiting blood   Severe abdominal pain   Severe chest pain   Black, tarry stools   Bleeding- more than one tablespoon   Any other symptom or condition that you feel may need urgent attention  Your doctor recommends these additional instructions:  If any biopsies were taken, your doctors clinic will contact you in 1 to 2   weeks with any results.  - Return patient to [facility] for ongoing care.   - Resume previous diet.   - Continue present medications.   - Perform a colonoscopy.  For questions, problems or results please call your physician - Tez Velasquez MD at Work:  [phone].  [facility] [place] [facility], EMERGENCY ROOM PHONE NUMBER: [phone]  IF A COMPLICATION OR EMERGENCY SITUATION ARISES AND YOU ARE UNABLE TO REACH   YOUR PHYSICIAN - GO DIRECTLY TO THE EMERGENCY ROOM.  Tez Velasquez MD  10/14/2024 4:01:52 PM  This report has been verified and signed electronically.  Dear patient,  As a result of recent federal legislation (The Federal Cures Act), you may   receive lab or pathology results from your procedure in your [facility]   account before your physician is able to contact you. Your physician or   their representative will relay the results to you with their   recommendations at their soonest availability.  Thank you,  PROVATION

## 2024-10-14 NOTE — ED NOTES
Assumed care of patient at this time. Pt resting comfortably in bed. Pt's family at the bedside. Vital signs are stable. Pt denied restroom use. No other complaints from pt at this time. Care ongoing.    LOC: The patient is awake, alert and aware of environment with an appropriate affect, the patient is oriented x 3 and speaking appropriately.   APPEARANCE: Patient appears comfortable and in no acute distress, patient is clean and well groomed.  SKIN: The skin is warm and dry, color consistent with ethnicity, patient has normal skin turgor and moist mucus membranes, skin intact, no breakdown or bruising noted.   MUSCULOSKELETAL: Patient moving all extremities spontaneously, no swelling noted.  RESPIRATORY: Airway is open and patent, respirations are spontaneous, patient has a normal effort and rate, no accessory muscle.  CARDIAC: Pt placed on cardiac monitor. Patient has a normal rate and regular rhythm, no edema noted, capillary refill < 3 seconds.   GASTRO: Soft and non tender to palpation, no distention noted. Denies n/v at this time.  : Pt denies any pain or frequency with urination.  NEURO: Pt opens eyes spontaneously, behavior appropriate to situation, follows commands, facial expression symmetrical, bilateral hand grasp equal and even, purposeful motor response noted, normal sensation in all extremities when touched with a finger.

## 2024-10-14 NOTE — MEDICAL/APP STUDENT
"  History     Chief Complaint   Patient presents with    Abdominal Pain     Generalized abdominal pain, rectal bleeding and coffee ground emesis x 2 days.  Last vomited 3 hours ago      Abdominal Pain  Associated symptoms include diarrhea, myalgias, nausea and vomiting.   Mr. [facility] Hart is a 39 year old male with a history of heroin and fentanyl use, tobacco use, and lactose intolerance who presented to the emergency department for bloody stools. For the past couple weeks he has been taking his roommates 800 mg ibuprofen x4-5 daily without food for impingement syndrome.Two days ago he ingested lactose then began having diarrhea with blood " all day and all night". This was accompanied with abdominal pain and a few episodes of emesis with the appearance of " coffee ground". In the ED, he had one episode of emesis w/ no presence of blood. He describes the b/l lower quadrant abdominal pain as " gas pains" that are sharp and stabbing. He uses heroin and fentanyl every few hours and reports last using this morning.     In the ED, RN administered one dose of morphine but he reports beginning to feel withdrawal symptoms. H/H dropped from 15/46.5 to 12.7/39.1. CMP unremarkable, CTA GI Bleed/Abd/Pelvis showed findings suggestive of colitis involving the descending and rectosigmoid colon and hepatosplenomegaly. Hep C/HIV negative, UA unremarkable. Simethicone, droperidol,zofran, and sucralfate administered    History reviewed. No pertinent past medical history.    History reviewed. No pertinent surgical history.    No family history on file.    Social History     Tobacco Use    Smoking status: Every Day     Current packs/day: 1.00     Types: Cigarettes    Smokeless tobacco: Never   Substance Use Topics    Alcohol use: Never    Drug use: Yes     Types: Methamphetamines, Heroin     Comment: last used one week ago       Review of Systems   Constitutional:  Positive for diaphoresis.   Respiratory:  Negative for chest tightness " "and shortness of breath.    Cardiovascular:  Negative for chest pain, palpitations and leg swelling.   Gastrointestinal:  Positive for abdominal pain, blood in stool, diarrhea, nausea and vomiting.   Genitourinary:  Negative for difficulty urinating.   Musculoskeletal:  Positive for back pain and myalgias.       Physical Exam   BP (!) 165/92   Pulse 94   Temp 98.2 °F (36.8 °C) (Oral)   Resp 14   Ht 5' 11" (1.803 m)   Wt 117.9 kg (260 lb)   SpO2 98%   BMI 36.26 kg/m²     Physical Exam    Constitutional: He is not diaphoretic. He appears ill. No distress.   HENT: Mouth/Throat: Mucous membranes are dry. Dental abscesses and dental caries present.   Cardiovascular:  Normal rate, regular rhythm, S1 normal and S2 normal.           Pulses:       Radial pulses are 2+ on the right side and 2+ on the left side.        Dorsalis pedis pulses are 2+ on the right side and 2+ on the left side.   Pulmonary/Chest: Breath sounds normal.   Abdominal: Abdomen is soft. Bowel sounds are normal. He exhibits no distension. There is generalized abdominal tenderness and tenderness in the right lower quadrant and left lower quadrant.   Worsened tenderness in the b/l lower quadrants     Neurological: He is alert. GCS eye subscore is 4. GCS verbal subscore is 5. GCS motor subscore is 6.   Psychiatric: His mood appears anxious.         ED Course     GI Bleed  - reports bloody diarrhea " all day and all night"   - H/H dropped from 15/46.5 to 12.7/39.1.  - CTA GI Bleed/Abd/Pelvis findings suggestive of colitis involving the descending and rectosigmoid colon  - PRN zofran,prochlorperazine, dicyclomine, and simethicone  - initiate PPI  - GI consult  - NPO    Heroin and Fentanyl Use  - initiate buprenorphine for withdrawal symptoms  - clonidine in adjunct with buprenorphine for  autonomic symptoms  - tizanidine for muscle spasms  - hydroxyzine for anxiety and agitation  - COWS q4    Tobacco Use  - smokes 1 pack daily  - tobacco cessation " education provided  - nicotine patch ordered    Chronic Back Pain/Impingement Syndrome  - initiate Robaxin, consider gabapentin  - PT consult  - Lidocaine patch

## 2024-10-14 NOTE — ASSESSMENT & PLAN NOTE
Pt endorses IVDU, states he typically uses heroin and fentanyl multiple times per day.   -UDS pending.  -PRNs for opiate withdrawal.  -COWS score q4.  -Encouraged cessation from illicit drugs.  -Addiction psychiatry consulted, appreciate assistance. Patient reports interest in MAT.

## 2024-10-14 NOTE — TREATMENT PLAN
GI Treatment Plan    S/p EGD today    Impression:    - Normal esophagus.                          - Z-line regular, 39 cm from the incisors.                          - Normal gastric body and antrum.                          - Normal examined duodenum.                          - No specimens collected.     Recommendation:        - Return patient to hospital [facility] for ongoing care.                          - Resume previous diet.                          - Continue present medications.                          - Perform a colonoscopy tomorrow     - clear liquid diet today  - NPO at midnight  - Prep to start at 6pm  - Hold anticoagulation and antiplatelets  - Patient agreeable to plan    Paul Basurto MD  Gastroenterology Fellow, PGY-IV

## 2024-10-14 NOTE — ASSESSMENT & PLAN NOTE
Body mass index is 36.26 kg/m². Obesity complicates all aspects of disease management from diagnostic modalities to treatment.

## 2024-10-14 NOTE — ANESTHESIA POSTPROCEDURE EVALUATION
Anesthesia Post Evaluation    Patient: Blu Hart    Procedure(s) Performed: Procedure(s) (LRB):  EGD (ESOPHAGOGASTRODUODENOSCOPY) (N/A)    Final Anesthesia Type: general      Patient location during evaluation: [facility]  Patient participation: Yes- Able to Participate  Level of consciousness: awake and alert  Post-procedure vital signs: reviewed and stable  Pain management: adequate  Airway patency: patent  TIBURCIO mitigation strategies: Multimodal analgesia  PONV status at discharge: No PONV  Anesthetic complications: no      Cardiovascular status: blood pressure returned to baseline, hemodynamically stable and stable  Respiratory status: unassisted, room air and spontaneous ventilation  Hydration status: euvolemic  Follow-up not needed.              Vitals Value Taken Time   /56 10/14/24 1631   Temp 36.7 °C (98 °F) 10/14/24 1600   Pulse 88 10/14/24 1631   Resp 15 10/14/24 1631   SpO2 100 % 10/14/24 1631   Vitals shown include unfiled device data.      No case tracking events are documented in the log.      Pain/Gema Score: Pain Rating Prior to Med Admin: 7 (10/14/2024  3:02 AM)  Pain Rating Post Med Admin: 0 (10/14/2024  7:13 AM)  Gema Score: 8 (10/14/2024  4:15 PM)

## 2024-10-14 NOTE — INTERVAL H&P NOTE
The patient has been examined and the H&P has been reviewed:    Pre-Procedure H and P Addendum    Patient seen and examined.  History and exam unchanged from prior history and physical.      Procedure: EGD  Indication: GI bleeding  ASA Class: per anesthesiology  Airway: normal  Neck Mobility: full range of motion  Mallampatti score: per anesthesia  History of anesthesia problems: no  Family history of anesthesia problems: no  Anesthesia Plan: MAC      Active Hospital Problems    Diagnosis  POA    *GI bleed [K92.2]  Yes    Chronic back pain [M54.9, G89.29]  Yes    Cigarette nicotine dependence without complication [F17.210]  Yes    Polysubstance abuse [F19.10]  Yes    Anemia [D64.9]  Yes    BMI 36.0-36.9,adult [Z68.36]  Not Applicable      Resolved Hospital Problems   No resolved problems to display.

## 2024-10-14 NOTE — ASSESSMENT & PLAN NOTE
Pt endorses IVDU, states he typically uses heroin and fentanyl multiple times per day.   -UDS pending.  -PRNs for opiate withdrawal.  -COWS score q4.  -Encouraged cessation from illicit drugs.  -Addiction psychiatry consulted, appreciate assistance. Patient reports interest in MAT.  - follow up on plans from addiction team

## 2024-10-14 NOTE — TRANSFER OF CARE
"Anesthesia Transfer of Care Note    Patient: Blu Hart    Procedure(s) Performed: Procedure(s) (LRB):  EGD (ESOPHAGOGASTRODUODENOSCOPY) (N/A)    Patient location: PACU    Anesthesia Type: general    Transport from OR: Transported from OR on room air with adequate spontaneous ventilation    Post pain: adequate analgesia    Post assessment: no apparent anesthetic complications and tolerated procedure well    Post vital signs: stable    Level of consciousness: awake and alert    Nausea/Vomiting: no nausea/vomiting    Complications: none    Transfer of care protocol was followed    Last vitals: Visit Vitals  /67 (Patient Position: Lying)   Pulse 94   Temp 37.4 °C (99.3 °F) (Temporal)   Resp 16   Ht 5' 11" (1.803 m)   Wt 117.9 kg (260 lb)   SpO2 96%   BMI 36.26 kg/m²     "

## 2024-10-14 NOTE — PROVIDER PROGRESS NOTES - EMERGENCY DEPT.
Encounter Date: 10/13/2024    ED Physician Progress Notes        Physician Note:   AOC @ 2300. Patient presented w/ coffee-ground emesis and bright blood per rectum. Concern for  NSAID induced upper GI bleed    Pending studies include CTA GI    Pending actions include follow up CT and admit to hospital medicine    Likely disposition is observation    Tommy Gee    12:41 AM  CTA shows evidence of some colitis but no extravasation of contrast.  Discussed with hospital medicine for admission

## 2024-10-14 NOTE — SUBJECTIVE & OBJECTIVE
Interval History: patient admitted overnight. Feeling some better this morning, no longer having n/v    Review of Systems   Constitutional:  Positive for fatigue. Negative for fever.   Respiratory:  Negative for shortness of breath.    Cardiovascular:  Negative for chest pain.   Gastrointestinal:  Positive for nausea. Negative for abdominal pain, blood in stool and vomiting.     Objective:     Vital Signs (Most Recent):  Temp: 97.9 °F (36.6 °C) (10/14/24 0700)  Pulse: 72 (10/14/24 1131)  Resp: 17 (10/14/24 1131)  BP: (!) 106/56 (10/14/24 1131)  SpO2: 95 % (10/14/24 1131) Vital Signs (24h Range):  Temp:  [97.9 °F (36.6 °C)-98.5 °F (36.9 °C)] 97.9 °F (36.6 °C)  Pulse:  [] 72  Resp:  [10-22] 17  SpO2:  [95 %-99 %] 95 %  BP: ()/() 106/56     Weight: 117.9 kg (260 lb)  Body mass index is 36.26 kg/m².    Intake/Output Summary (Last 24 hours) at 10/14/2024 1253  Last data filed at 10/14/2024 0713  Gross per 24 hour   Intake 670.92 ml   Output --   Net 670.92 ml         Physical Exam  Constitutional:       Appearance: Normal appearance.   Cardiovascular:      Rate and Rhythm: Normal rate.      Pulses: Normal pulses.      Heart sounds: Murmur heard.   Pulmonary:      Effort: Pulmonary effort is normal. No respiratory distress.      Breath sounds: Normal breath sounds.   Abdominal:      General: Abdomen is flat. Bowel sounds are normal. There is no distension.      Palpations: Abdomen is soft.   Skin:     General: Skin is warm and dry.   Neurological:      Mental Status: He is alert.             Significant Labs: All pertinent labs within the past 24 hours have been reviewed.    Significant Imaging: I have reviewed all pertinent imaging results/findings within the past 24 hours.

## 2024-10-14 NOTE — ED NOTES
Assumed care of the patient. Report received from PREETHI Randolph. Pt on continuous cardiac monitoring, continuous pulse oximetry, and automatic BP cuff cycling Q30min. Pt in hospital gown, side rails up X2, bed low and locked, and call light is placed within reach. One family/visitors at bedside at this time. Pt denies any complaints or needs.

## 2024-10-14 NOTE — ANESTHESIA PREPROCEDURE EVALUATION
[facility]  Anesthesia Pre-Operative Evaluation         Patient Name/: Blu Hart, 1985  MRN: [id number]    SUBJECTIVE:     Pre-operative evaluation for Procedure(s) (LRB):  EGD (ESOPHAGOGASTRODUODENOSCOPY) (N/A)     10/14/2024    Blu Hart is a 39 y.o. male w/ a significant PMHx of chronic back pain, nicotine dependence, polysubstance abuse, and seizure disorder (per patient and family bedside, no daily medication, last seizure was about a month ago) who presents due to BRBPR and coffee ground emesis. Last Hb 11.6    Patient now presents for the above procedure(s).    ________________________________________  No results found for this or any previous visit.    ________________________________________    LDA:        Peripheral IV - Single Lumen 10/13/24 2244 20 G Left Upper Arm (Active)   Number of days: 0       Drips:       Patient Active Problem List   Diagnosis    Polysubstance abuse    GI bleed    Chronic back pain    Cigarette nicotine dependence without complication    Anemia    BMI 36.0-36.9,adult       Review of patient's allergies indicates:  No Known Allergies    Current Inpatient Medications:    LIDOcaine  2 patch Transdermal Q24H    nicotine  1 patch Transdermal Daily    pantoprazole  40 mg Intravenous BID    sucralfate  1 g Oral Q6H       No current facility-administered medications on file prior to encounter.     Current Outpatient Medications on File Prior to Encounter   Medication Sig Dispense Refill    ondansetron (ZOFRAN-ODT) 4 MG TbDL Take 1 tablet (4 mg total) by mouth every 6 (six) hours as needed (nausea). 15 tablet 0    polyethylene glycol (GLYCOLAX) 17 gram/dose powder Use to cap to measure 17g, mix with liquid, and take by mouth once daily. 510 g 0       History reviewed. No pertinent surgical history.    Social History:  Tobacco Use: High Risk (10/14/2024)    Patient History     Smoking Tobacco Use: Every Day     Smokeless Tobacco Use: Never     Passive  Exposure: Not on file       Alcohol Use: Not At Risk (4/20/2020)    AUDIT-C     Frequency of Alcohol Consumption: Never     Average Number of Drinks: Not on file     Frequency of Binge Drinking: Not on file       OBJECTIVE:     Vital Signs Range:  BMI Readings from Last 1 Encounters:   10/13/24 36.26 kg/m²       Temp:  [36.6 °C (97.9 °F)-37.4 °C (99.3 °F)]   Pulse:  [69-94]   Resp:  [10-20]   BP: ()/(50-71)   SpO2:  [95 %-99 %]        Significant Labs:        Component Value Date/Time    WBC 9.86 10/14/2024 0913    HGB 11.6 (L) 10/14/2024 0913    HCT 35.7 (L) 10/14/2024 0913    HCT 44 08/05/2024 0318     10/14/2024 0913     10/14/2024 0153    K 3.6 10/14/2024 0153     10/14/2024 0153    CO2 25 10/14/2024 0153     10/14/2024 0153    BUN 6 10/14/2024 0153    CREATININE 0.7 10/14/2024 0153    MG 1.9 10/14/2024 0153    CALCIUM 9.4 10/14/2024 0153    ALBUMIN 3.4 (L) 10/14/2024 0153    PROT 7.7 10/14/2024 0153    ALKPHOS 74 10/14/2024 0153    BILITOT 0.5 10/14/2024 0153    AST 11 10/14/2024 0153    ALT 9 (L) 10/14/2024 0153    INR 1.0 10/14/2024 0153        Please see Results Review for additional labs.     Diagnostic Studies: No relevant studies.    EKG:   Results for orders placed or performed during the hospital encounter of 10/13/24   EKG 12-lead    Collection Time: 10/13/24  8:53 PM   Result Value Ref Range    QRS Duration 86 ms    OHS QTC Calculation 426 ms    Narrative    Test Reason : K92.2,    Vent. Rate : 075 BPM     Atrial Rate : 075 BPM     P-R Int : 150 ms          QRS Dur : 086 ms      QT Int : 382 ms       P-R-T Axes : 054 066 060 degrees     QTc Int : 426 ms    Normal sinus rhythm  Normal ECG  When compared with ECG of 05-AUG-2024 02:05,  No significant change was found  Confirmed by Gasper GARVIN, Roel ([id number]) on 10/14/2024 7:48:06 AM    Referred By: [email or web address]   SELF           Confirmed By:Roel Jackman MD       ECHO:  See subjective, if available.      ASSESSMENT/PLAN:        "                                                                                                           10/14/2024  Blu Hart is a 39 y.o., male.      Pre-op Assessment    I have reviewed the Patient Summary Reports.     I have reviewed the Nursing Notes.    I have reviewed the Medications.     Review of Systems  Anesthesia Hx:  No problems with previous Anesthesia   History of prior surgery of interest to airway management or planning:          Denies Family Hx of Anesthesia complications.    Denies Personal Hx of Anesthesia complications.                    Social:  Smoker, Recreational Drugs       Hematology/Oncology:    Oncology Normal    -- Anemia:                                  Cardiovascular:      Denies Hypertension.       Denies Angina.        ECG has been reviewed.                          Pulmonary:       Denies Shortness of breath.  Denies Recent URI.                 Renal/:  Renal/ Normal                 Hepatic/GI:      Denies GERD. Denies Liver Disease.  Coffee ground emesis, BRBPR          Neurological:    Denies CVA.    Seizures          Chronic Pain Syndrome (chronic back pain)                         Endocrine:  Endocrine Normal Denies Diabetes.         Obesity / BMI > 30      Physical Exam  General: Well nourished, Alert and Cooperative    Airway:  Mallampati: II   Mouth Opening: Normal  TM Distance: Normal  Tongue: Normal  Neck ROM: Normal ROM    Dental:  Periodontal disease  Multiple missing teeth, none reported loose but "brittle"      Anesthesia Plan  Type of Anesthesia, risks & benefits discussed:    Anesthesia Type: Gen Natural Airway  Intra-op Monitoring Plan: Standard ASA Monitors  Post Op Pain Control Plan: multimodal analgesia and IV/PO Opioids PRN  Induction:  IV  Informed Consent: Informed consent signed with the Patient and all parties understand the risks and agree with anesthesia plan.  All questions answered.   ASA Score: 2  Day of Surgery Review of History & " Physical: H&P Update referred to the surgeon/provider.    Ready For Surgery From Anesthesia Perspective.     .

## 2024-10-14 NOTE — ASSESSMENT & PLAN NOTE
Anemia is likely due to acute blood loss which was from GIB vs colitis . Most recent hemoglobin and hematocrit are listed below.  Recent Labs     10/13/24  2130   HGB 12.7*   HCT 39.1*     Plan  - Monitor serial CBC: Every 8 hours  - Transfuse PRBC if patient becomes hemodynamically unstable, symptomatic or H/H drops below 7/21.  - Patient has not received any PRBC transfusions to date  - Patient's anemia is currently stable  - Anemia panel pending.

## 2024-10-14 NOTE — ED PROVIDER NOTES
Encounter Date: 10/13/2024       History     Chief Complaint   Patient presents with    Abdominal Pain     Generalized abdominal pain, rectal bleeding and coffee ground emesis x 2 days.  Last vomited 3 hours ago      39-year-old male past medical history of opiate abuse and questionable hepatitis presents today with rectal bleeding and coffee-ground emesis x2 days with abdominal pain.  Patient reports he began having some blood in his stool and dark stools yesterday.  Abdominal pain he was diffuse and began yesterday as well.  Nothing makes it worse or better.  He reports last night he began having some coffee-ground emesis.  Last emesis was few hours prior to arrival.  Denies any anticoagulation.  Reports while ago someone told him he has hepatitis but never since then.  Denies any prior GI bleeds.  Denies any endoscopy or colonoscopy.  Patient does report excessive amount of NSAID use over the past 2 weeks.  Denies any syncope or near-syncope.    The history is provided by the patient. No  was used.     Review of patient's allergies indicates:  No Known Allergies  History reviewed. No pertinent past medical history.  History reviewed. No pertinent surgical history.  No family history on file.  Social History     Tobacco Use    Smoking status: Every Day     Current packs/day: 1.00     Types: Cigarettes    Smokeless tobacco: Never   Substance Use Topics    Alcohol use: Never    Drug use: Yes     Types: Methamphetamines, Heroin, Fentanyl     Comment: last used one week ago     Review of Systems    Physical Exam     Initial Vitals [10/13/24 1930]   BP Pulse Resp Temp SpO2   (!) 204/102 102 (!) 22 98.2 °F (36.8 °C) 99 %      MAP       --         Physical Exam    Nursing note and vitals reviewed.  Constitutional: He appears well-developed. No distress.   HENT:   Head: Normocephalic and atraumatic.   Nose: Nose normal.   Eyes: EOM are normal. Pupils are equal, round, and reactive to light.   Neck:  Neck supple. No tracheal deviation present. No JVD present.   Cardiovascular:  Normal rate, regular rhythm, normal heart sounds and intact distal pulses.     Exam reveals no gallop and no friction rub.       No murmur heard.  Pulmonary/Chest: Breath sounds normal. No respiratory distress. He has no wheezes. He has no rhonchi. He has no rales.   Abdominal: Abdomen is soft. Bowel sounds are normal. He exhibits no distension. There is no abdominal tenderness. There is no rebound and no guarding.   Musculoskeletal:         General: Normal range of motion.      Cervical back: Neck supple.     Neurological: He is alert and oriented to person, place, and time. He has normal strength. No cranial nerve deficit or sensory deficit.   Skin: Skin is warm and dry. Capillary refill takes less than 2 seconds. No rash noted.   Psychiatric: He has a normal mood and affect.         ED Course   Procedures  Labs Reviewed   CBC W/ AUTO DIFFERENTIAL - Abnormal       Result Value    WBC 10.55      RBC 4.63      Hemoglobin 12.7 (*)     Hematocrit 39.1 (*)     MCV 84      MCH 27.4      MCHC 32.5      RDW 13.2      Platelets 257      MPV 9.9      Immature Granulocytes 0.3      Gran # (ANC) 7.4      Immature Grans (Abs) 0.03      Lymph # 2.0      Mono # 1.1 (*)     Eos # 0.0      Baso # 0.02      nRBC 0      Gran % 70.0      Lymph % 18.5      Mono % 10.8      Eosinophil % 0.2      Basophil % 0.2      Differential Method Automated     COMPREHENSIVE METABOLIC PANEL - Abnormal    Sodium 139      Potassium 3.9      Chloride 101      CO2 26      Glucose 97      BUN 6      Creatinine 0.8      Calcium 9.8      Total Protein 8.3      Albumin 3.7      Total Bilirubin 0.5      Alkaline Phosphatase 80      AST 13      ALT 7 (*)     eGFR >60.0      Anion Gap 12     URINALYSIS, REFLEX TO URINE CULTURE - Abnormal    Specimen UA Urine, Clean Catch      Color, UA Yellow      Appearance, UA Clear      pH, UA 6.0      Specific Gravity, UA >1.030 (*)      Protein, UA Trace (*)     Glucose, UA Negative      Ketones, UA Negative      Bilirubin (UA) Negative      Occult Blood UA Negative      Nitrite, UA Negative      Leukocytes, UA Negative      Narrative:     Specimen Source->Urine   TOXICOLOGY SCREEN, URINE, RANDOM (COMPLIANCE) - Abnormal    Alcohol, Urine <10      Benzodiazepines Negative      Methadone metabolites Negative      Cocaine (Metab.) Negative      Opiate Scrn, Ur Presumptive Positive (*)     Barbiturate Screen, Ur Negative      Amphetamine Screen, Ur Negative      THC Presumptive Positive (*)     Phencyclidine Negative      Creatinine, Urine 166.0      Toxicology Information SEE COMMENT      Narrative:     Specimen Source->Urine   COMPREHENSIVE METABOLIC PANEL - Abnormal    Sodium 138      Potassium 3.6      Chloride 103      CO2 25      Glucose 106      BUN 6      Creatinine 0.7      Calcium 9.4      Total Protein 7.7      Albumin 3.4 (*)     Total Bilirubin 0.5      Alkaline Phosphatase 74      AST 11      ALT 9 (*)     eGFR >60.0      Anion Gap 10     FENTANYL, URINE - Abnormal    Fentanyl, Urine Presumptive Positive (*)     Creatinine, Urine 166.0      Narrative:     ADD ON URINE FENTANYL PER ADA TAMAYO NP/ORDER# [id number] @ 3:47AM  Specimen Source->Urine   IRON AND TIBC - Abnormal    Iron 48      Transferrin 168 (*)     TIBC 249 (*)     Saturated Iron 19 (*)    CBC W/ AUTO DIFFERENTIAL - Abnormal    WBC 9.86      RBC 4.19 (*)     Hemoglobin 11.6 (*)     Hematocrit 35.7 (*)     MCV 85      MCH 27.7      MCHC 32.5      RDW 13.2      Platelets 253      MPV 9.9      Immature Granulocytes 0.3      Gran # (ANC) 6.6      Immature Grans (Abs) 0.03      Lymph # 2.3      Mono # 0.8      Eos # 0.1      Baso # 0.04      nRBC 0      Gran % 67.2      Lymph % 23.4      Mono % 7.9      Eosinophil % 0.8      Basophil % 0.4      Differential Method Automated     LIPASE    Lipase 7     PROTIME-INR    Prothrombin Time 11.4      INR 1.0     APTT    aPTT 28.9      MAGNESIUM    Magnesium 1.9     FENTANYL, URINE   FERRITIN    Ferritin 185     FOLATE    Folate 6.7     VITAMIN B12    Vitamin B-12 350     CBC W/ AUTO DIFFERENTIAL   TYPE & SCREEN    Group & Rh A NEG      Indirect Brian NEG      Specimen Outdate 10/16/2024 23:59     ISTAT CHEM8        ECG Results              EKG 12-lead (Final result)        Collection Time Result Time QRS Duration [facility] QTC Calculation    10/13/24 20:53:15 10/14/24 07:48:10 86 426                     Final result by Interface, Lab In [facility] (10/14/24 07:48:20)                   Narrative:    Test Reason : K92.2,    Vent. Rate : 075 BPM     Atrial Rate : 075 BPM     P-R Int : 150 ms          QRS Dur : 086 ms      QT Int : 382 ms       P-R-T Axes : 054 066 060 degrees     QTc Int : 426 ms    Normal sinus rhythm  Normal ECG  When compared with ECG of 05-AUG-2024 02:05,  No significant change was found  Confirmed by Roel Jackman MD ([id number]) on 10/14/2024 7:48:06 AM    Referred By: [email or web address]   SELF           Confirmed By:Roel Jackman MD                                  Imaging Results              CTA Acute GI [place], Abdomen and Pelvis (Final result)  Result time 10/13/24 23:52:41      Final result by Adal Miranda MD (10/13/24 23:52:41)                   Impression:      Findings suggestive of a nonspecific colitis involving the descending and rectosigmoid colon.  No brisk active GI bleed.  Consider endoscopy follow-up if there is persistent clinical concern.    Hepatosplenomegaly.    Age advanced degenerative changes in both hips.    Other findings discussed in the body of the report.      Electronically signed by: Adal Miranda MD  Date:    10/13/2024  Time:    23:52               Narrative:    EXAMINATION:  CTA ACUTE GI BLEED, ABDOMEN AND PELVIS    CLINICAL HISTORY:  Gi bleed;    TECHNIQUE:  CT images of the abdomen and pelvis were obtained prior to and after the IV administration of 100 mL of Omnipaque 350 .  Oral contrast was not  given. Post-contrast images were obtained in the arterial and delayed phases per GI bleed protocol.  Axial, coronal, and sagittal reconstructions were created from the source data.    COMPARISON:  CT abdomen pelvis, 08/05/2024.    FINDINGS:  Lower Chest:    Lung bases are clear.  Heart size is normal.    Abdomen:    Liver is enlarged and similar to the prior study.  No worrisome liver mass identified.  Subcentimeter hypodensity in the liver again noted, similar to prior CT.  Gallbladder is unremarkable. No intrahepatic biliary ductal dilatation.    Spleen is mildly enlarged.  Adrenal glands and pancreas are unremarkable.    The kidneys are symmetric.  No hydronephrosis. No asymmetric perinephric fat stranding.    Stomach is not overly distended.  No small bowel obstruction.  There is significant wall thickening of the descending and rectosigmoid colon from the splenic flexure to the rectum.  Hyperemia of the left hemicolon without brisk active intraluminal GI bleed identified.  Mild pericolonic fat stranding and minimal fluid in the left pericolic gutter.  Liquid stool in the colon and rectum.  Normal appendix.    No pneumoperitoneum or organized fluid collection.    No bulky retroperitoneal lymphadenopathy.    Abdominal aorta is normal in caliber without significant atherosclerosis.    Portal, splenic, and superior mesenteric veins are patent.  No portal venous gas.    Pelvis:    Urinary bladder and prostate are unremarkable.  Mild rectal wall thickening.  Liquid stool in the rectum.  No significant pelvic free fluid.    Bones and soft tissues:    No aggressive osseous lesions.  Degenerative changes in the spine.  Age advanced degenerative changes in both hips.  Extraperitoneal soft tissues are negative for acute finding.  Small fat containing umbilical hernia.                                       Medications   pantoprazole injection 40 mg (40 mg Intravenous Given 10/14/24 0903)   acetaminophen tablet 650 mg (has  no administration in time range)   ondansetron injection 4 mg (has no administration in time range)   prochlorperazine injection Soln 5 mg (has no administration in time range)   dicyclomine capsule 10 mg (has no administration in time range)   hydrOXYzine HCL tablet 50 mg (50 mg Oral Given 10/14/24 0922)   cyclobenzaprine tablet 10 mg (10 mg Oral Given 10/14/24 0922)   sucralfate 100 mg/mL suspension 1 g (1 g Oral Given 10/14/24 0601)   simethicone chewable tablet 160 mg (has no administration in time range)   LIDOcaine 5 % patch 2 patch (2 patches Transdermal Patch Applied 10/14/24 0215)   nicotine 21 mg/24 hr 1 patch (1 patch Transdermal Patch Applied 10/14/24 0901)   pantoprazole injection 80 mg (80 mg Intravenous Given 10/13/24 2134)   morphine injection 4 mg (4 mg Intravenous Given 10/13/24 2134)   ondansetron injection 8 mg (8 mg Intravenous Given 10/13/24 2244)   iohexoL (OMNIPAQUE 350) injection 100 mL (100 mLs Intravenous Given 10/13/24 2340)   sucralfate 100 mg/mL suspension 1 g (1 g Oral Given 10/14/24 0214)   lactated ringers bolus 500 mL (0 mLs Intravenous Stopped 10/14/24 0335)   simethicone chewable tablet 160 mg (160 mg Oral Given 10/14/24 0214)   droPERidol injection 0.625 mg (0.625 mg Intravenous Given 10/14/24 0215)   acetaminophen tablet 1,000 mg (1,000 mg Oral Given 10/14/24 0302)   magnesium sulfate in dextrose IVPB (premix) 1 g (0 g Intravenous Stopped 10/14/24 0713)     Medical Decision Making  Signed out pending CT GI bleed protocol with plan for admission.    Amount and/or Complexity of Data Reviewed  Labs: ordered. Decision-making details documented in ED Course.  Radiology: ordered.    Risk  Prescription drug management.               ED Course as of 10/14/24 1141   Sun Oct 13, 2024   2127 39-year-old male past medical history of opiate abuse and questionable hepatitis presents today with acute GI bleed.  Afebrile.  Mild tachycardia.  Hypertensive oxygenating well.  Diffuse mild  abdominal tenderness.  No peritonitis.  Differential diagnosis includes upper and lower GI bleed, diverticulitis, appendicitis, less likely varices as patient states he is not a drinker  Will get labs and CT for GI bleed. [BD]   2246 BP(!): 204/102 [BD]   2246 Temp: 98.2 °F (36.8 °C) [BD]   2246 Pulse: 102 [BD]   2246 Resp(!): 22 [BD]   2246 SpO2: 99 % [BD]   2246 Hemoglobin(!): 12.7 [BD]   2247 Hemoglobin slightly dropped to 12.7 baseline was around 15 most recently 2 months ago.  Vital signs reassuring so no indication for emergent transfusion at this time. [BD]      ED Course User Index  [BD] Roney Driscoll MD                           Clinical Impression:  Final diagnoses:  [K92.2] GI bleed          ED Disposition Condition    Observation Stable                Roney Driscoll MD  10/14/24 1142

## 2024-10-14 NOTE — SUBJECTIVE & OBJECTIVE
History reviewed. No pertinent past medical history.    History reviewed. No pertinent surgical history.    Review of patient's allergies indicates:  No Known Allergies  Family History    None       Tobacco Use    Smoking status: Every Day     Current packs/day: 1.00     Types: Cigarettes    Smokeless tobacco: Never   Substance and Sexual Activity    Alcohol use: Never    Drug use: Yes     Types: Methamphetamines, Heroin, Fentanyl     Comment: last used one week ago    Sexual activity: Not on file     Review of Systems   Constitutional:  Positive for fatigue. Negative for chills and fever.   Respiratory:  Negative for shortness of breath.    Cardiovascular:  Negative for chest pain.   Gastrointestinal:  Positive for abdominal distention, abdominal pain, blood in stool, constipation, diarrhea and vomiting. Negative for nausea.   Genitourinary:  Negative for dysuria.   Neurological:  Positive for weakness. Negative for light-headedness.     Objective:     Vital Signs (Most Recent):  Temp: 97.9 °F (36.6 °C) (10/14/24 0700)  Pulse: 74 (10/14/24 0730)  Resp: 16 (10/14/24 0730)  BP: (!) 93/53 (10/14/24 0730)  SpO2: 96 % (10/14/24 0730) Vital Signs (24h Range):  Temp:  [97.9 °F (36.6 °C)-98.5 °F (36.9 °C)] 97.9 °F (36.6 °C)  Pulse:  [] 74  Resp:  [10-22] 16  SpO2:  [95 %-99 %] 96 %  BP: ()/() 93/53     Weight: 117.9 kg (260 lb) (10/13/24 2105)  Body mass index is 36.26 kg/m².      Intake/Output Summary (Last 24 hours) at 10/14/2024 0805  Last data filed at 10/14/2024 0713  Gross per 24 hour   Intake 670.92 ml   Output --   Net 670.92 ml       Lines/Drains/Airways       Peripheral Intravenous Line  Duration                  Peripheral IV - Single Lumen 10/13/24 2244 20 G Left Upper Arm <1 day                     Physical Exam  Constitutional:       General: He is not in acute distress.     Appearance: He is obese. He is ill-appearing.   HENT:      Head: Normocephalic and atraumatic.      Mouth/Throat:       Pharynx: Oropharynx is clear.   Eyes:      General: No scleral icterus.  Cardiovascular:      Rate and Rhythm: Normal rate.      Pulses: Normal pulses.   Pulmonary:      Effort: Pulmonary effort is normal. No respiratory distress.   Abdominal:      General: There is distension.      Palpations: Abdomen is soft.      Tenderness: There is abdominal tenderness (lower quadrants). There is no guarding or rebound.   Musculoskeletal:         General: No deformity.   Skin:     General: Skin is warm and dry.   Neurological:      Mental Status: He is alert and oriented to person, place, and time.   Psychiatric:         Mood and Affect: Mood normal.         Behavior: Behavior normal.          Significant Labs:  CBC:   Recent Labs   Lab 10/13/24  2130   WBC 10.55   HGB 12.7*   HCT 39.1*        CMP:   Recent Labs   Lab 10/14/24  0153      CALCIUM 9.4   ALBUMIN 3.4*   PROT 7.7      K 3.6   CO2 25      BUN 6   CREATININE 0.7   ALKPHOS 74   ALT 9*   AST 11   BILITOT 0.5     All pertinent lab results from the last 24 hours have been reviewed.    Significant Imaging:  Imaging results within the past 24 hours have been reviewed.

## 2024-10-14 NOTE — PROGRESS NOTES
[facility] - Emergency Dept  [facility] Medicine  Progress Note    Patient Name: Blu Hart  MRN: [id number]  Patient Class: OP- Observation   Admission Date: 10/13/2024  Length of Stay: 0 days  Attending Physician: Ravin Shanks DO  Primary Care Provider: Adelaida, Primary Doctor        Subjective:     Principal Problem:GI bleed        HPI:  Blu Hart is a 39 y.o. male with a PMHx chronic back pain, nicotine dependence, and polysubstance abuse who presents to the ED with complaints of abdominal pain, rectal bleeding, and hematemesis x2 days. The patient reports he initially had diarrhea and lower abdominal pain 3 days ago which he thought was due to having ice cream because he is lactose intolerant. He states his symptoms worsened the next day and he began having multiple episodes of bloody diarrhea, noting both bright red blood and darker maroon stools. He states later that night he had an episode of emesis that he describes as dark, coffee-ground and reports an additional episode of coffee-ground emesis a few hours prior to coming to the ED. He vomited in the ED as well but did not note any blood. He reports the abdominal pain is mostly lower and is cramping with intermittent stabbing pain. He also endorses associated nausea, diaphoresis, chills, and lightheadedness. He denies fever, CP, SOB, cough, syncope, or dysuria. He denies EtOH use but reports taking 800mg of ibuprofen 4-5x/daily for the past 2 weeks. He also endorses IVDU and typically uses heroin and fentanyl multiple times per day and last used this morning.    In the ED, patient initially hypertensive and tachycardic which improved with pain control otherwise vitals stable, afebrile. CBC with stable anemia. Lipase 7. CMP unremarkable. Type & screen obtained. CTA abd/pelvis with findings suggestive of a nonspecific colitis involving the descending and rectosigmoid colon. No brisk active GI bleed. Hepatosplenomegaly. Age advanced degenerative changes  in both hips. The patient received 4mg IV morphine, zofran 8mg x1, and protonix 80mg.    Overview/Hospital Course:  Patient to go with GI for EGD to evaluate, with plans for OP colonoscopy in the future.     Interval History: patient admitted overnight. Feeling some better this morning, no longer having n/v    Review of Systems   Constitutional:  Positive for fatigue. Negative for fever.   Respiratory:  Negative for shortness of breath.    Cardiovascular:  Negative for chest pain.   Gastrointestinal:  Positive for nausea. Negative for abdominal pain, blood in stool and vomiting.     Objective:     Vital Signs (Most Recent):  Temp: 97.9 °F (36.6 °C) (10/14/24 0700)  Pulse: 72 (10/14/24 1131)  Resp: 17 (10/14/24 1131)  BP: (!) 106/56 (10/14/24 1131)  SpO2: 95 % (10/14/24 1131) Vital Signs (24h Range):  Temp:  [97.9 °F (36.6 °C)-98.5 °F (36.9 °C)] 97.9 °F (36.6 °C)  Pulse:  [] 72  Resp:  [10-22] 17  SpO2:  [95 %-99 %] 95 %  BP: ()/() 106/56     Weight: 117.9 kg (260 lb)  Body mass index is 36.26 kg/m².    Intake/Output Summary (Last 24 hours) at 10/14/2024 1253  Last data filed at 10/14/2024 0713  Gross per 24 hour   Intake 670.92 ml   Output --   Net 670.92 ml         Physical Exam  Constitutional:       Appearance: Normal appearance.   Cardiovascular:      Rate and Rhythm: Normal rate.      Pulses: Normal pulses.      Heart sounds: Murmur heard.   Pulmonary:      Effort: Pulmonary effort is normal. No respiratory distress.      Breath sounds: Normal breath sounds.   Abdominal:      General: Abdomen is flat. Bowel sounds are normal. There is no distension.      Palpations: Abdomen is soft.   Skin:     General: Skin is warm and dry.   Neurological:      Mental Status: He is alert.             Significant Labs: All pertinent labs within the past 24 hours have been reviewed.    Significant Imaging: I have reviewed all pertinent imaging results/findings within the past 24 hours.    Assessment/Plan:       * GI bleed  Patient's hemorrhage is due to gastrointestinal bleed, patient does not have a propensity for bleeding.. Patients most recent Hgb, Hct, platelets, and INR are listed below.  Recent Labs     10/13/24  2130 10/14/24  0153 10/14/24  0913   HGB 12.7*  --  11.6*   HCT 39.1*  --  35.7*     --  253   INR  --  1.0  --    -Afebrile, no leukocytosis.  -CTA abd/pelvis with findings suggestive of a nonspecific colitis involving the descending and rectosigmoid colon. No brisk active GI bleed. Hepatosplenomegaly. Age advanced degenerative changes in both hips.    Plan  - Will trend hemoglobin/hematocrit qd  - Will monitor and correct any coagulation defects  - Will transfuse if Hgb is <7g/dl (<8g/dl in cases of active ACS) or if patient has rapid bleeding leading to hemodynamic instability  - Started on GIB pathway.  - Use IV anti-emetics as needed.   - plan for EGD today with GI, further treatment pending results    Anemia  Anemia is likely due to acute blood loss which was from GIB vs colitis . Most recent hemoglobin and hematocrit are listed below.  Recent Labs     10/13/24  2130 10/14/24  0913   HGB 12.7* 11.6*   HCT 39.1* 35.7*     Plan  - Monitor serial CBC: qd  - Transfuse PRBC if patient becomes hemodynamically unstable, symptomatic or H/H drops below 7/21.  - Patient has not received any PRBC transfusions to date  - Patient's anemia is currently stable    BMI 36.0-36.9,adult  Body mass index is 36.26 kg/m². Obesity complicates all aspects of disease management from diagnostic modalities to treatment.     Cigarette nicotine dependence without complication  Dangers of cigarette smoking were reviewed with patient in detail. Patient was Counseled for 3-10 minutes. Nicotine replacement options were discussed. Nicotine replacement was discussed- prescribed    Chronic back pain  -Chronic issue.  -PRN tylenol, lidocaine, and prn zanaflex.    Polysubstance abuse  Pt endorses IVDU, states he typically uses  heroin and fentanyl multiple times per day.   -UDS pending.  -PRNs for opiate withdrawal.  -COWS score q4.  -Encouraged cessation from illicit drugs.  -Addiction psychiatry consulted, appreciate assistance. Patient reports interest in MAT.  - follow up on plans from addiction team      VTE Risk Mitigation (From admission, onward)           Ordered     IP VTE HIGH RISK PATIENT  Once         10/14/24 0121     Place sequential compression device  Until discontinued         10/14/24 0121                    Discharge Planning   CHIP:      Code Status: Full Code   Is the patient medically ready for discharge?:     Reason for patient still in hospital (select all that apply): Patient trending condition and Consult recommendations                     Ravin Shanks,   Department of Hospital Medicine   [facility] - Emergency Dept

## 2024-10-14 NOTE — ED NOTES
Blu Hart, a 39 y.o. male presents to the ED w/ complaint of Abdominal pain   Reports abdominal pain x1 day. Rectal bleeding and coffee ground emesis x2days.     Triage note:  Chief Complaint   Patient presents with    Abdominal Pain     Generalized abdominal pain, rectal bleeding and coffee ground emesis x 2 days.  Last vomited 3 hours ago      Review of patient's allergies indicates:  No Known Allergies  History reviewed. No pertinent past medical history.

## 2024-10-14 NOTE — SUBJECTIVE & OBJECTIVE
History reviewed. No pertinent past medical history.    History reviewed. No pertinent surgical history.    Review of patient's allergies indicates:  No Known Allergies    No current facility-administered medications on file prior to encounter.     Current Outpatient Medications on File Prior to Encounter   Medication Sig    ondansetron (ZOFRAN-ODT) 4 MG TbDL Take 1 tablet (4 mg total) by mouth every 6 (six) hours as needed (nausea).    polyethylene glycol (GLYCOLAX) 17 gram/dose powder Use to cap to measure 17g, mix with liquid, and take by mouth once daily.     Family History    None       Tobacco Use    Smoking status: Every Day     Current packs/day: 1.00     Types: Cigarettes    Smokeless tobacco: Never   Substance and Sexual Activity    Alcohol use: Never    Drug use: Yes     Types: Methamphetamines, Heroin     Comment: last used one week ago    Sexual activity: Not on file     Review of Systems   Constitutional:  Positive for chills, diaphoresis and fatigue. Negative for appetite change and fever.   HENT:  Negative for congestion, rhinorrhea and sore throat.    Eyes:  Negative for photophobia and visual disturbance.   Respiratory:  Negative for cough, shortness of breath and wheezing.    Cardiovascular:  Negative for chest pain, palpitations and leg swelling.   Gastrointestinal:  Positive for abdominal pain, blood in stool, diarrhea, nausea and vomiting. Negative for abdominal distention.   Genitourinary:  Negative for dysuria, frequency and hematuria.   Musculoskeletal:  Positive for arthralgias (chronic) and back pain (chronic). Negative for gait problem.   Skin:  Negative for color change, pallor, rash and wound.   Neurological:  Positive for light-headedness. Negative for syncope, weakness and headaches.   Psychiatric/Behavioral:  Negative for confusion and hallucinations. The patient is not nervous/anxious.      Objective:     Vital Signs (Most Recent):  Temp: 98.5 °F (36.9 °C) (10/14/24 0300)  Pulse: 81  (10/14/24 0200)  Resp: 18 (10/14/24 0300)  BP: 137/61 (10/14/24 0300)  SpO2: 95 % (10/14/24 0300) Vital Signs (24h Range):  Temp:  [98.2 °F (36.8 °C)-98.5 °F (36.9 °C)] 98.5 °F (36.9 °C)  Pulse:  [] 81  Resp:  [11-22] 18  SpO2:  [95 %-99 %] 95 %  BP: (123-204)/() 137/61     Weight: 117.9 kg (260 lb)  Body mass index is 36.26 kg/m².     Physical Exam  Vitals and nursing note reviewed.   Constitutional:       General: He is sleeping. He is not in acute distress.     Appearance: He is obese. He is diaphoretic. He is not toxic-appearing.   HENT:      Head: Normocephalic and atraumatic.      Nose: Nose normal.      Mouth/Throat:      Mouth: Mucous membranes are moist.   Eyes:      Pupils: Pupils are equal, round, and reactive to light.   Cardiovascular:      Rate and Rhythm: Normal rate.      Pulses: Normal pulses.   Pulmonary:      Effort: Pulmonary effort is normal. No respiratory distress.      Breath sounds: No wheezing, rhonchi or rales.   Abdominal:      General: Bowel sounds are normal. There is no distension.      Palpations: Abdomen is soft.      Tenderness: There is abdominal tenderness in the right lower quadrant and left lower quadrant. There is no guarding.   Musculoskeletal:         General: Normal range of motion.      Cervical back: Normal range of motion.      Right lower leg: No edema.      Left lower leg: No edema.   Skin:     General: Skin is warm.      Capillary Refill: Capillary refill takes less than 2 seconds.   Neurological:      General: No focal deficit present.      Mental Status: He is oriented to person, place, and time and easily aroused.      Motor: No weakness.   Psychiatric:         Speech: Speech normal.         Behavior: Behavior is cooperative.              CRANIAL NERVES     CN III, IV, VI   Pupils are equal, round, and reactive to light.       Significant Labs: All pertinent labs within the past 24 hours have been reviewed.  CBC:   Recent Labs   Lab 10/13/24  2130   WBC  10.55   HGB 12.7*   HCT 39.1*        CMP:   Recent Labs   Lab 10/13/24  2130      K 3.9      CO2 26   GLU 97   BUN 6   CREATININE 0.8   CALCIUM 9.8   PROT 8.3   ALBUMIN 3.7   BILITOT 0.5   ALKPHOS 80   AST 13   ALT 7*   ANIONGAP 12       Significant Imaging: I have reviewed all pertinent imaging results/findings within the past 24 hours.  Imaging Results              CTA Acute GI [place], Abdomen and Pelvis (Final result)  Result time 10/13/24 23:52:41      Final result by Adal Miranda MD (10/13/24 23:52:41)                   Impression:      Findings suggestive of a nonspecific colitis involving the descending and rectosigmoid colon.  No brisk active GI bleed.  Consider endoscopy follow-up if there is persistent clinical concern.    Hepatosplenomegaly.    Age advanced degenerative changes in both hips.    Other findings discussed in the body of the report.      Electronically signed by: Adal Miranda MD  Date:    10/13/2024  Time:    23:52               Narrative:    EXAMINATION:  CTA ACUTE GI BLEED, ABDOMEN AND PELVIS    CLINICAL HISTORY:  Gi bleed;    TECHNIQUE:  CT images of the abdomen and pelvis were obtained prior to and after the IV administration of 100 mL of Omnipaque 350 .  Oral contrast was not given. Post-contrast images were obtained in the arterial and delayed phases per GI bleed protocol.  Axial, coronal, and sagittal reconstructions were created from the source data.    COMPARISON:  CT abdomen pelvis, 08/05/2024.    FINDINGS:  Lower Chest:    Lung bases are clear.  Heart size is normal.    Abdomen:    Liver is enlarged and similar to the prior study.  No worrisome liver mass identified.  Subcentimeter hypodensity in the liver again noted, similar to prior CT.  Gallbladder is unremarkable. No intrahepatic biliary ductal dilatation.    Spleen is mildly enlarged.  Adrenal glands and pancreas are unremarkable.    The kidneys are symmetric.  No hydronephrosis. No asymmetric  perinephric fat stranding.    Stomach is not overly distended.  No small bowel obstruction.  There is significant wall thickening of the descending and rectosigmoid colon from the splenic flexure to the rectum.  Hyperemia of the left hemicolon without brisk active intraluminal GI bleed identified.  Mild pericolonic fat stranding and minimal fluid in the left pericolic gutter.  Liquid stool in the colon and rectum.  Normal appendix.    No pneumoperitoneum or organized fluid collection.    No bulky retroperitoneal lymphadenopathy.    Abdominal aorta is normal in caliber without significant atherosclerosis.    Portal, splenic, and superior mesenteric veins are patent.  No portal venous gas.    Pelvis:    Urinary bladder and prostate are unremarkable.  Mild rectal wall thickening.  Liquid stool in the rectum.  No significant pelvic free fluid.    Bones and soft tissues:    No aggressive osseous lesions.  Degenerative changes in the spine.  Age advanced degenerative changes in both hips.  Extraperitoneal soft tissues are negative for acute finding.  Small fat containing umbilical hernia.

## 2024-10-14 NOTE — ASSESSMENT & PLAN NOTE
39 yoM with pmh of polysubstance abuse for whom GI was consulted for BRBPR and coffee ground emesis. He was initially constipated but ir progressed to diarrhea; concerning for overflow. He then began to have BRBPR and lower abdominal pain. Then, on the day of admission, he also had an episode of coffee ground emesis. Hgb was 12.7 on admit, prior value from August was 15. CTA negative for brisk bleed but did show evidence of colitis in the descending and sigmoid colon. Since admission, he has not had any additional episodes of bleeding. He has remained HDS. He has never had a EGD or colonoscopy.    - Plan for EGD today while inpatient and outpatient colonoscopy.  - Trend Hgb q12hrs. Transfuse for Hgb > 7, unless otherwise indicated  - Bolus IV pantoprazole 80mg followed by 40mg BID  - Maintain IV access with 2 large bore Ivs  - Intravascular resuscitation/support with IVFs   - Keep NPO  - Hold all NSAIDs and anticoagulants, unless contraindicated  - Please correct any coagulopathy with platelets and FFP for goal of platelets >50K and INR <2.0  - Please notify GI team if there is significant change in patient's clinical status

## 2024-10-15 ENCOUNTER — ANESTHESIA (OUTPATIENT)
Dept: ENDOSCOPY | Facility: HOSPITAL | Age: 39
End: 2024-10-15
Payer: COMMERCIAL

## 2024-10-15 LAB
ALBUMIN SERPL BCP-MCNC: 3.2 G/DL (ref 3.5–5.2)
ALP SERPL-CCNC: 75 U/L (ref 55–135)
ALT SERPL W/O P-5'-P-CCNC: 8 U/L (ref 10–44)
ANION GAP SERPL CALC-SCNC: 8 MMOL/L (ref 8–16)
AST SERPL-CCNC: 10 U/L (ref 10–40)
BASOPHILS # BLD AUTO: 0.03 K/UL (ref 0–0.2)
BASOPHILS NFR BLD: 0.3 % (ref 0–1.9)
BILIRUB SERPL-MCNC: 0.5 MG/DL (ref 0.1–1)
BUN SERPL-MCNC: 6 MG/DL (ref 6–20)
CALCIUM SERPL-MCNC: 9.1 MG/DL (ref 8.7–10.5)
CHLORIDE SERPL-SCNC: 102 MMOL/L (ref 95–110)
CO2 SERPL-SCNC: 27 MMOL/L (ref 23–29)
CREAT SERPL-MCNC: 0.7 MG/DL (ref 0.5–1.4)
DIFFERENTIAL METHOD BLD: ABNORMAL
EOSINOPHIL # BLD AUTO: 0.1 K/UL (ref 0–0.5)
EOSINOPHIL NFR BLD: 1 % (ref 0–8)
ERYTHROCYTE [DISTWIDTH] IN BLOOD BY AUTOMATED COUNT: 13.3 % (ref 11.5–14.5)
EST. GFR  (NO RACE VARIABLE): >60 ML/MIN/1.73 M^2
GLUCOSE SERPL-MCNC: 89 MG/DL (ref 70–110)
HCT VFR BLD AUTO: 35.8 % (ref 40–54)
HGB BLD-MCNC: 11.6 G/DL (ref 14–18)
IMM GRANULOCYTES # BLD AUTO: 0.03 K/UL (ref 0–0.04)
IMM GRANULOCYTES NFR BLD AUTO: 0.3 % (ref 0–0.5)
LYMPHOCYTES # BLD AUTO: 2.5 K/UL (ref 1–4.8)
LYMPHOCYTES NFR BLD: 26.5 % (ref 18–48)
MAGNESIUM SERPL-MCNC: 1.9 MG/DL (ref 1.6–2.6)
MCH RBC QN AUTO: 28 PG (ref 27–31)
MCHC RBC AUTO-ENTMCNC: 32.4 G/DL (ref 32–36)
MCV RBC AUTO: 87 FL (ref 82–98)
MONOCYTES # BLD AUTO: 0.7 K/UL (ref 0.3–1)
MONOCYTES NFR BLD: 6.9 % (ref 4–15)
NEUTROPHILS # BLD AUTO: 6.2 K/UL (ref 1.8–7.7)
NEUTROPHILS NFR BLD: 65 % (ref 38–73)
NRBC BLD-RTO: 0 /100 WBC
PLATELET # BLD AUTO: 237 K/UL (ref 150–450)
PMV BLD AUTO: 9.7 FL (ref 9.2–12.9)
POTASSIUM SERPL-SCNC: 3.5 MMOL/L (ref 3.5–5.1)
PROT SERPL-MCNC: 7 G/DL (ref 6–8.4)
RBC # BLD AUTO: 4.14 M/UL (ref 4.6–6.2)
SODIUM SERPL-SCNC: 137 MMOL/L (ref 136–145)
WBC # BLD AUTO: 9.56 K/UL (ref 3.9–12.7)

## 2024-10-15 PROCEDURE — 63600175 PHARM REV CODE 636 W HCPCS: Performed by: NURSE ANESTHETIST, CERTIFIED REGISTERED

## 2024-10-15 PROCEDURE — 96366 THER/PROPH/DIAG IV INF ADDON: CPT

## 2024-10-15 PROCEDURE — 45380 COLONOSCOPY AND BIOPSY: CPT | Mod: 74 | Performed by: INTERNAL MEDICINE

## 2024-10-15 PROCEDURE — 25000003 PHARM REV CODE 250: Performed by: NURSE PRACTITIONER

## 2024-10-15 PROCEDURE — 63600175 PHARM REV CODE 636 W HCPCS: Performed by: NURSE PRACTITIONER

## 2024-10-15 PROCEDURE — 63600175 PHARM REV CODE 636 W HCPCS: Performed by: PHYSICIAN ASSISTANT

## 2024-10-15 PROCEDURE — 37000009 HC ANESTHESIA EA ADD 15 MINS: Performed by: INTERNAL MEDICINE

## 2024-10-15 PROCEDURE — 0DBM8ZX EXCISION OF DESCENDING COLON, VIA NATURAL OR ARTIFICIAL OPENING ENDOSCOPIC, DIAGNOSTIC: ICD-10-PCS | Performed by: INTERNAL MEDICINE

## 2024-10-15 PROCEDURE — 96376 TX/PRO/DX INJ SAME DRUG ADON: CPT

## 2024-10-15 PROCEDURE — 99232 SBSQ HOSP IP/OBS MODERATE 35: CPT | Mod: ,,, | Performed by: PSYCHIATRY & NEUROLOGY

## 2024-10-15 PROCEDURE — S4991 NICOTINE PATCH NONLEGEND: HCPCS | Performed by: NURSE PRACTITIONER

## 2024-10-15 PROCEDURE — 85025 COMPLETE CBC W/AUTO DIFF WBC: CPT

## 2024-10-15 PROCEDURE — 27201012 HC FORCEPS, HOT/COLD, DISP: Performed by: INTERNAL MEDICINE

## 2024-10-15 PROCEDURE — 36415 COLL VENOUS BLD VENIPUNCTURE: CPT | Performed by: NURSE PRACTITIONER

## 2024-10-15 PROCEDURE — 96375 TX/PRO/DX INJ NEW DRUG ADDON: CPT

## 2024-10-15 PROCEDURE — 96367 TX/PROPH/DG ADDL SEQ IV INF: CPT

## 2024-10-15 PROCEDURE — 37000008 HC ANESTHESIA 1ST 15 MINUTES: Performed by: INTERNAL MEDICINE

## 2024-10-15 PROCEDURE — G0378 HOSPITAL OBSERVATION PER HR: HCPCS

## 2024-10-15 PROCEDURE — A4216 STERILE WATER/SALINE, 10 ML: HCPCS | Performed by: NURSE ANESTHETIST, CERTIFIED REGISTERED

## 2024-10-15 PROCEDURE — 88305 TISSUE EXAM BY PATHOLOGIST: CPT | Performed by: PATHOLOGY

## 2024-10-15 PROCEDURE — 25000003 PHARM REV CODE 250

## 2024-10-15 PROCEDURE — 83735 ASSAY OF MAGNESIUM: CPT | Performed by: NURSE PRACTITIONER

## 2024-10-15 PROCEDURE — 88305 TISSUE EXAM BY PATHOLOGIST: CPT | Mod: 26,,, | Performed by: PATHOLOGY

## 2024-10-15 PROCEDURE — 45380 COLONOSCOPY AND BIOPSY: CPT | Mod: 52,,, | Performed by: INTERNAL MEDICINE

## 2024-10-15 PROCEDURE — 25000003 PHARM REV CODE 250: Performed by: NURSE ANESTHETIST, CERTIFIED REGISTERED

## 2024-10-15 PROCEDURE — 63600175 PHARM REV CODE 636 W HCPCS

## 2024-10-15 PROCEDURE — 80053 COMPREHEN METABOLIC PANEL: CPT | Performed by: NURSE PRACTITIONER

## 2024-10-15 RX ORDER — SODIUM CHLORIDE 0.9 % (FLUSH) 0.9 %
SYRINGE (ML) INJECTION
Status: DISCONTINUED | OUTPATIENT
Start: 2024-10-15 | End: 2024-10-15

## 2024-10-15 RX ORDER — ALUMINUM HYDROXIDE, MAGNESIUM HYDROXIDE, AND SIMETHICONE 1200; 120; 1200 MG/30ML; MG/30ML; MG/30ML
30 SUSPENSION ORAL EVERY 6 HOURS PRN
Status: DISCONTINUED | OUTPATIENT
Start: 2024-10-15 | End: 2024-10-18 | Stop reason: HOSPADM

## 2024-10-15 RX ORDER — SODIUM CHLORIDE 0.9 % (FLUSH) 0.9 %
3 SYRINGE (ML) INJECTION
Status: DISCONTINUED | OUTPATIENT
Start: 2024-10-15 | End: 2024-10-15 | Stop reason: HOSPADM

## 2024-10-15 RX ORDER — GLUCAGON 1 MG
1 KIT INJECTION
Status: DISCONTINUED | OUTPATIENT
Start: 2024-10-15 | End: 2024-10-15 | Stop reason: HOSPADM

## 2024-10-15 RX ORDER — PHENYLEPHRINE HYDROCHLORIDE 10 MG/ML
INJECTION INTRAVENOUS
Status: DISCONTINUED | OUTPATIENT
Start: 2024-10-15 | End: 2024-10-15

## 2024-10-15 RX ORDER — PROPOFOL 10 MG/ML
VIAL (ML) INTRAVENOUS
Status: DISCONTINUED | OUTPATIENT
Start: 2024-10-15 | End: 2024-10-15

## 2024-10-15 RX ORDER — LIDOCAINE HYDROCHLORIDE 20 MG/ML
INJECTION INTRAVENOUS
Status: DISCONTINUED | OUTPATIENT
Start: 2024-10-15 | End: 2024-10-15

## 2024-10-15 RX ORDER — KETOROLAC TROMETHAMINE 15 MG/ML
15 INJECTION, SOLUTION INTRAMUSCULAR; INTRAVENOUS ONCE
Status: COMPLETED | OUTPATIENT
Start: 2024-10-15 | End: 2024-10-15

## 2024-10-15 RX ADMIN — PIPERACILLIN SODIUM AND TAZOBACTAM SODIUM 4.5 G: 4; .5 INJECTION, POWDER, FOR SOLUTION INTRAVENOUS at 02:10

## 2024-10-15 RX ADMIN — SUCRALFATE 1 G: 1 SUSPENSION ORAL at 01:10

## 2024-10-15 RX ADMIN — SUCRALFATE 1 G: 1 SUSPENSION ORAL at 05:10

## 2024-10-15 RX ADMIN — LIDOCAINE HYDROCHLORIDE 100 MG: 20 INJECTION INTRAVENOUS at 11:10

## 2024-10-15 RX ADMIN — PANTOPRAZOLE SODIUM 40 MG: 40 INJECTION, POWDER, LYOPHILIZED, FOR SOLUTION INTRAVENOUS at 09:10

## 2024-10-15 RX ADMIN — KETOROLAC TROMETHAMINE 15 MG: 15 INJECTION, SOLUTION INTRAMUSCULAR; INTRAVENOUS at 11:10

## 2024-10-15 RX ADMIN — PROPOFOL 100 MG: 10 INJECTION, EMULSION INTRAVENOUS at 11:10

## 2024-10-15 RX ADMIN — PROPOFOL 50 MG: 10 INJECTION, EMULSION INTRAVENOUS at 11:10

## 2024-10-15 RX ADMIN — Medication 10 ML: at 12:10

## 2024-10-15 RX ADMIN — SUCRALFATE 1 G: 1 SUSPENSION ORAL at 06:10

## 2024-10-15 RX ADMIN — PHENYLEPHRINE HYDROCHLORIDE 100 MCG: 10 INJECTION INTRAVENOUS at 11:10

## 2024-10-15 RX ADMIN — Medication 1 PATCH: at 08:10

## 2024-10-15 RX ADMIN — PROPOFOL 150 MCG/KG/MIN: 10 INJECTION, EMULSION INTRAVENOUS at 11:10

## 2024-10-15 RX ADMIN — Medication 10 ML: at 11:10

## 2024-10-15 RX ADMIN — PANTOPRAZOLE SODIUM 40 MG: 40 INJECTION, POWDER, LYOPHILIZED, FOR SOLUTION INTRAVENOUS at 08:10

## 2024-10-15 RX ADMIN — SUCRALFATE 1 G: 1 SUSPENSION ORAL at 11:10

## 2024-10-15 RX ADMIN — ACETAMINOPHEN 650 MG: 325 TABLET ORAL at 06:10

## 2024-10-15 RX ADMIN — GLYCOPYRROLATE 0.2 MG: 0.2 INJECTION, SOLUTION INTRAMUSCULAR; INTRAVENOUS at 11:10

## 2024-10-15 RX ADMIN — LIDOCAINE 2 PATCH: 50 PATCH CUTANEOUS at 01:10

## 2024-10-15 NOTE — PLAN OF CARE
Pt durham to Rm#[id number] via WC, accompanied by gf, stable for trans, NAD, VSS, AAOx3. Report was given to floor nurse Amanda. Pt has been drinking and angela PO fluids, working on drinking all of the golytely for colonoscopy tomorrow.

## 2024-10-15 NOTE — PLAN OF CARE
Problem: Adult Inpatient Plan of Care  Goal: Plan of Care Review  Outcome: Progressing  Flowsheets (Taken 10/15/2024 7843)  Plan of Care Reviewed With:   patient   spouse  Goal: Patient-Specific Goal (Individualized)  Outcome: Progressing  Goal: Absence of Hospital-Acquired Illness or Injury  Outcome: Progressing  Goal: Optimal Comfort and Wellbeing  Outcome: Progressing  Goal: Readiness for Transition of Care  Outcome: Progressing     Problem: Gastrointestinal Bleeding  Goal: Optimal Coping with Acute Illness  Outcome: Progressing  Goal: Hemostasis  Outcome: Progressing  Pt AAOX4, surgery performed today, VS stable, scheduled meds given, urinal place within reach. Educated pt on the use of call light, instructed the pt to call for assitance if needed, call light within reach. No acute events noted during this shift. Plan of care ongoing.

## 2024-10-15 NOTE — TREATMENT PLAN
GI Treatment Plan    S/p colonoscopy today    Impression:    - Preparation of the colon was poor.                          - Diffuse severe inflammation was found in the                          descending colon, at the splenic flexure and in                          the distal transverse colon secondary to ischemic                          colitis. Biopsied. I suspect this is due to                          substance abuse.    Recommendation:        - Return patient to hospital [facility] for ongoing care.                          - Advance diet as tolerated.                          - Continue present medications.                          - Await pathology results.                          - Continue supportive care.                          - Substance abuse counseling and assistance.                          - Repeat colonoscopy in 3 months to check healing.    GI will sign off.    Paul Basurto MD  Gastroenterology Fellow, PGY-IV

## 2024-10-15 NOTE — NURSING TRANSFER
Nursing Transfer Note      10/15/2024   12:58 PM    Pt transported to 1160 on stretcher.  VSS, denies pain, sob, n/v.  Report given to receiving floor rn

## 2024-10-15 NOTE — ANESTHESIA POSTPROCEDURE EVALUATION
Anesthesia Post Evaluation    Patient: Blu Hart    Procedure(s) Performed: Procedure(s) (LRB):  COLONOSCOPY (N/A)    Final Anesthesia Type: general      Patient location during evaluation: floor  Patient participation: Yes- Able to Participate  Level of consciousness: awake and alert and oriented  Post-procedure vital signs: reviewed and stable  Pain management: adequate  Airway patency: patent  TIBURCIO mitigation strategies: Multimodal analgesia and Intraoperative administration of CPAP, nasopharyngeal airway, or oral appliance during sedation  PONV status at discharge: No PONV  Anesthetic complications: no      Cardiovascular status: blood pressure returned to baseline and hemodynamically stable  Respiratory status: unassisted, spontaneous ventilation and room air  Hydration status: euvolemic  Follow-up not needed.              Vitals Value Taken Time   /66 10/15/24 1246   Temp 36.7 °C (98.1 °F) 10/15/24 1230   Pulse 62 10/15/24 1258   Resp 13 10/15/24 1257   SpO2 100 % 10/15/24 1257   Vitals shown include unfiled device data.      Event Time   Out of Recovery 12:58:14         Pain/Gema Score: Pain Rating Prior to Med Admin: 0 (10/14/2024  9:47 PM)  Pain Rating Post Med Admin: 0 (10/14/2024  7:13 AM)  Gema Score: 9 (10/15/2024 12:30 PM)

## 2024-10-15 NOTE — PLAN OF CARE
[facility] - End[facility]  Initial Discharge Assessment       Primary Care Provider: No, Primary Doctor    Admission Diagnosis: GI bleed [K92.2]  Excessive use of nonsteroidal anti-inflammatory drug (NSAID) [F19.90]  Anemia, unspecified type [D64.9]    Admission Date: 10/13/2024  Expected Discharge Date:     Transition of Care Barriers: (P) None    Payor: CIGNA / Plan: CIGNA EPO / Product Type: PPO /     Extended Emergency Contact Information  Primary Emergency Contact: Romy Almeida  Address: [address] [place] [address]           [place] [address] [facility]  Home Phone: [phone]  Mobile Phone: [phone]  Relation: Significant other  Preferred language: English   needed? No    Discharge Plan A: (P) Home with family  Discharge Plan B: (P) Home      [address] - [address] [place] [address]  [address] [address] [address]  Phone: [phone] Fax: [phone]      Initial Assessment (most recent)       Adult Discharge Assessment - 10/15/24 1018          Discharge Assessment    Assessment Type Discharge Planning Assessment (P)      Confirmed/corrected address, phone number and insurance Yes (P)      Confirmed Demographics Correct on Facesheet (P)      Source of Information patient;family (P)      If unable to respond/provide information was family/caregiver contacted? Yes (P)      Contact Name/Number Romy Almeida (Significant other)  [phone] (P)      Does patient/caregiver understand observation status Yes (P)      Communicated CHIP with patient/caregiver Yes (P)      People in Home spouse (P)      Do you expect to return to your current living situation? Yes (P)      Do you have help at home or someone to help you manage your care at home? Yes (P)      Who are your caregiver(s) and their phone number(s)? Romy Almeida (Significant other)  [phone] (P)      Prior to hospitilization cognitive status: Alert/Oriented (P)      Current cognitive status: Alert/Oriented  (P)      Walking or Climbing Stairs Difficulty no (P)      Dressing/Bathing Difficulty no (P)      Home Layout Able to live on 1st floor (P)      Equipment Currently Used at Home none (P)      Readmission within 30 days? No (P)      Patient currently being followed by outpatient case management? No (P)      Do you currently have service(s) that help you manage your care at home? No (P)      Do you take prescription medications? No (P)      Do you have prescription coverage? Yes (P)      Coverage [employer] - CIGNA EPO - (P)      Who is going to help you get home at discharge? Romy Almeida (Significant other)  [phone] (P)      How do you get to doctors appointments? car, drives self;family or friend will provide (P)      Are you on dialysis? No (P)      Do you take coumadin? No (P)      Discharge Plan A Home with family (P)      Discharge Plan B Home (P)      DME Needed Upon Discharge  none (P)      Discharge Plan discussed with: Patient;Spouse/sig other (P)      Name(s) and Number(s) Romy Almeida (Significant other)  [phone] (P)      Transition of Care Barriers None (P)         Physical Activity    On average, how many days per week do you engage in moderate to strenuous exercise (like a brisk walk)? 0 days (P)      On average, how many minutes do you engage in exercise at this level? 0 min (P)         Financial Resource Strain    How hard is it for you to pay for the very basics like food, housing, medical care, and heating? Somewhat hard (P)    Declines resources       Housing Stability    In the last 12 months, was there a time when you were not able to pay the mortgage or rent on time? No (P)      At any time in the past 12 months, were you homeless or living in a shelter (including now)? No (P)         Transportation Needs    Has the lack of transportation kept you from medical appointments, meetings, work or from getting things needed for daily living? No (P)         Food Insecurity    Within the past 12  months, you worried that your food would run out before you got the money to buy more. Never true (P)      Within the past 12 months, the food you bought just didn't last and you didn't have money to get more. Never true (P)         Alcohol Use    Q1: How often do you have a drink containing alcohol? Never (P)      Q2: How many drinks containing alcohol do you have on a typical day when you are drinking? Patient does not drink (P)      Q3: How often do you have six or more drinks on one occasion? Never (P)         Utilities    In the past 12 months has the electric, gas, oil, or water company threatened to shut off services in your home? No (P)         Health Literacy    How often do you need to have someone help you when you read instructions, pamphlets, or other written material from your doctor or pharmacy? Never (P)         OTHER    Name(s) of People in Home Romy Almeida (Significant other)  [phone] (P)                  Discharge Plan A and Plan B have been determined by review of patient's clinical status, future medical and therapeutic needs, and coverage/benefits for post-acute care in coordination with multidisciplinary team members.                           SCOUT Adorno, LMSW  [facility]  Case Management  Ext. [phone]

## 2024-10-15 NOTE — PROGRESS NOTES
[facility]   DEPARTMENT OF PSYCHIATRY     IDENTIFIERS & DEMOGRAPHICS:     ENCOUNTER: subsequent    -- PATIENT IDENTIFIERS: Blu Hart  [id number]  1985  39 y.o.  male  -- LOCATION OF PATIENT: unit (psych)    -- MODE OF ARRIVAL: self-presented  -- PRESENT WITH PATIENT DURING SESSION: partner  -- SOURCES OF INFORMATION: PATIENT, partner  -- ENCOUNTER PROVIDER: Adal Polanco IV, MD        PRESENTATION:     CHIEF COMPLAINT(S): opioid use disorder and withdrawal    OVERVIEW OF THE HPI:    39M with hx polysubstance abuse, chronic back pain presented with suspected GI bleed likely due to NSAID use. Addiction consulted for heroin and fentanyl use.     SUBJECTIVE/CURRENT FINDINGS:    Patient interviewed with partner at bedside. Reports feeling better today though still concerned with GI workup. Reports continued but improved abdominal pain, some BRBPR, continued restlessness and cold sweats. States he has made no additional decision on MAT or detox/rehab as he is still primarily concerned with medical issues. Voices no further questions. Voices new concern that he would not have daily transportation to a methadone clinic. Educated on benefit of suboxone for this reason. Denies SI/HI/AVH. Feels he is still not in much withdrawal based on his past experience.     REVIEW OF SYSTEMS:    >> SOURCES: patient     N   Sleep Disturbance/Disruption   N   Appetite/Weight Change   N   Alterations in Energy Level   N   Impaired Focus/Concentration   Y   Depressive Symptomatology   Y   Excessive Anxiety/Worry   N   Dysregulated Mood/Behavior   N   Manic Symptomatology   N   Psychosis    Regarding the current presentation, no other significant issues or complaints are voiced or known at this time.       ADD-ON PSYCHOTHERAPY:     ADD-ON THERAPY     HISTORY:     HISTORY    Allergies:  Patient has no known allergies.     EXAMINATION:     VITALS:  BP (!) 141/77   Pulse 81   Temp 98.2 °F  "(36.8 °C) (Temporal)   Resp 16   Ht 5' 11" (1.803 m)   Wt 117.9 kg (260 lb)   SpO2 98%   BMI 36.26 kg/m²      MENTAL STATUS EXAMINATION:  Appearance: appropriately dressed, adequately groomed, in no apparent distress    heavily tattooed  Behavior & Attitude: participative, under adequate behavioral control  calm, agreeable, cooperative    Movements & Motor Activity: no psychomotor agitation, no psychomotor retardation, no tremor    Speech & Language: normal rate, normal volume, normal quantity, spontaneous, reciprocal, fluent    Mood: "better"  Affect: euthymic, reactive, full range  appropriate given the situation/context    Thought Process & Associations: linear, goal-directed, organized, logical, coherent    Thought Content & Perceptions: no delusions, no paranoid ideation, no hallucinations    Sensorium: awake, alert, clear  no confusion    Orientation: fully intact    Recent & Remote Memory: intact (recent), intact (remote)    Attention & Concentration: attentive to conversation, not easily distracted    Fund of Knowledge: intact, vocabulary proficient    Insight: partial    help seeking, conflicting views on MAT  Judgment: partial    help seeking, though not decided on treatment        DSM-5-TR SUBSTANCE USE DISORDER CRITERIA:     -- Impaired Control:  I[x]I Y  I[]I N  I[]I U  I[]I A  I[]I D  Often take in larger amounts or over a longer period of time than was intended:   I[x]I Y  I[]I N  I[]I U  I[]I A  I[]I D  Persistent desire or unsuccessful efforts to cut down or control use:   I[x]I Y  I[]I N  I[]I U  I[]I A  I[]I D  Great deal of time spent in activities necessary to obtain substance, use, or recover from effects:   I[]I Y  I[]I N  I[]I U  I[]I A  I[]I D  Craving/strong desire for substance or urge to use:   -- Social Impairment:  I[]I Y  I[]I N  I[]I U  I[]I A  I[]I D  Use resulting in failure to fulfill major role obligations at home, work or school:   I[x]I Y  I[]I N  I[]I U "  I[]I A  I[]I D  Social, occupational, recreational activities decreased because of use:   I[]I Y  I[]I N  I[]I U  I[]I A  I[]I D  Continued use despite having persistent or recurrent social or interpersonal problems caused or exacerbated by the substance:   -- Risky Use:  I[]I Y  I[]I N  I[]I U  I[]I A  I[]I D  Recurrent use in situations in which it is physically hazardous:   I[x]I Y  I[]I N  I[]I U  I[]I A  I[]I D  Use despite physical or psychological problems that are likely to have been caused or exacerbated by the substance:   -- Neuroadaptation:  I[x]I Y  I[]I N  I[]I U  I[]I A  I[]I D  Tolerance, as defined by either of the following: (1) a need for markedly increased amounts of substance to achieve intoxication or desired effect.  -OR- (2) a markedly diminished effect with continued use of the same amount of substance:   I[x]I Y  I[]I N  I[]I U  I[]I A  I[]I D  Withdrawal, as manifested by either of the following: (1) the characteristic withdrawal syndrome for substance.  -OR- (2) substance is taken to relieve or avoid withdrawal symptoms:   -- Mild (1-3), Moderate (4-5), Severe (>=6)    I[]I N/A  I[]I Y  I[]I N  I[]I U  I[]I A  I[]I D  Active Substance Use Disorder:         RISK & REGULATORY:      RISK PARAMETERS (current to the encounter/episode  NOT inclusive of past history):     N   Suicidal Ideation/Threats   N   Suicide Attempts/Gestures   N   Homicidal Ideation/Threats   N   Homicidal Behavior   N   Non-Suicidal Self-Injurious Behavior   N   Perpetrated Violence     FIREARMS & WEAPONS:     N   Ready Access to Firearms     SAFETY SCREENINGS:    -- PROTECTIVE FACTORS: IDENTIFIED       - SPECIFIC FACTORS IDENTIFIED: fear of bad outcome, accessible support, impact on others    -- RISK FACTORS: IDENTIFIED     - SPECIFIC MODIFIABLE FACTORS IDENTIFIED: intoxication/use, substance abuse    -- FUTURE ORIENTED: YES    -- CAREGIVER(S)     - SUPPORTIVE & APPROPRIATELY INVOLVED:  YES     - GLOBAL FUNCTIONING: below baseline/modestly so     REGULATORY:    -- : REVIEWED  no discrepancies or irregularities are noted      INFORMED CONSENT & SHARED DECISION MAKING are the hallmark and bedrock of good clinical care, and as such have been employed and obtained, respectively, to the degree possible.  Discussed, to the extent possible, diagnosis, risks and benefits of proposed treatment (e.g., medication, therapy) vs alternative treatments vs no treatment, potential side effects of these treatments, and the inherent unpredictability of treatment.  Resources have been provided via the patient instructions in the AVS to supplement, augment, and reinforce discussions, counseling, and/or interventions.       - ABILITY TO UNDERSTAND, PARTICIPATE & ENGAGE: adequate     - AGREEABLE TO TREATMENT (consent/assent): the patient consents to treatment     - RELIABILITY/ACCURACY: the patient is deemed to be a historian of unknown reliability and accuracy      WARNINGS & PRECAUTIONS:  >> In cases of emergencies (e.g. SI/HI resulting in danger to self or others, functioning deteriorating to the level of grave disability), call 911 or [phone], or present to the emergency department for immediate assistance.    >> Individuals should not operate a motor vehicle or heavy machinery if effects of medications or underlying symptoms/condition impair the ability to do so safely.    >> FULLY comply with ANY/ALL medication as prescribed/instructed and report ANY/ALL suspected adverse effects to appropriate health care providers.       ASSESSMENT & PLAN:     DIAGNOSES & PROBLEMS:       1.  Opioid withdrawal  acute with systemic symptoms    2.  Opioid use disorder, severe  chronic with exacerbation/progression    PSYCHOTROPIC REGIMEN:   (C)=Continue as prescribed  (A)=Adjust as noted  (I)=Iniitate  (D)=Discontinue      1.  Opiate withdrawal PRNs (C)    2.  Subutex 4 mg BID then change to suboxone after 1-2 days IF patient  changes mind on starting MAT    -- ASSESSMENT (synthesis  analysis):     39M with hx polysubstance use including heroin, fentanyl, THC presenting with GI bleed. Addiction is consulted for substance use. Patient appears in early detox, would expect it to draw out longer due to fentanyl use. Will continue to discuss MAT on follow up with more clarity on hospital course and patient decision.      - GLOBAL FUNCTIONING: below baseline/modestly so    -- PLAN (goals  recommentations):        With reasonable medical certainty, based on history, chart review, available collateral information, and a present-state examination:  - PEC is not indicated  - options for medication-assisted treatment (MAT) for opioid use disorder were discussed  -- RECOMMENDATIONS STATUS POST DETOX: establish and maintain sobriety and a recovery lifestyle, complete a licensed accredited rehab program, and engage in 12 step (or equivalent) meetings and activities  - recommend patient enroll in addiction rehab program after discharge and medical stabilization  - counseled on full abstinence from alcohol and substances of abuse (illicit and prescription)  - relapse prevention and motivational interviewing provided  - provided resources for various addiction rehabilitation options as part of aftercare planning     Opiate Withdrawal Protocol:              -Clonidine 0.1mg PO q4hr PRN for withdrawal associated HTN              -Bentyl 10mg PO q6hr PRN for abdominal muscle cramps              -Loperamide 2mg PO q6hr PRN for diarrhea              -Robaxin 500mg PO q6hr PRN for muscle spasm              -Zofran 4mg SL q8hr PRN for nausea    - Patient still undecided on MAT. Can start above regimen if he agrees later. Will sign off. Thank you for the consult.     MEDICAL DECISION MAKING:    - DIAGNOSTICS: a diagnostic psychiatric evaluation was performed and responsiveness to treatment was assessed  ability/capacity to respond to treatment:  adequate/intact    CHART REVIEW: available documentation has been reviewed, and pertinent elements of the chart have been incorporated into this evaluation where appropriate.       DIAGNOSTIC TESTING:      Glu 89  10/15/2024  Li *   *  TSH 0.65  12/17/2023    HgA1c *   *  VPA *   *   FT4 1.76  12/17/2023    Na 137  10/15/2024  CLZ *   *  WBC 9.56  10/15/2024    Cr 0.7  10/15/2024  ANC 6.2; 65.0;   10/15/2024   Hgb 11.6 (L)  10/15/2024     BUN 6  10/15/2024  Trop I *   *  HCT 35.8 (L)  10/15/2024     GFR >60.0  10/15/2024     3/26/2023    10/15/2024     Alb 3.2 (L)  10/15/2024   PRL *   *  B12 350  10/14/2024     T Bili 0.5  10/15/2024  Chol 150  7/19/2023  B9 6.7  10/14/2024    ALP 75  10/15/2024  TGs 105  7/19/2023  B1 *   *    AST 10  10/15/2024  HDL 39 (L)  7/19/2023  Vit D *   *     ALT 8 (L)  10/15/2024  LDL 90  7/19/2023  HIV Non-reactive  8/5/2024     INR 1.0  10/14/2024  Missy *   *   Hep C Non-reactive  8/5/2024    GGT *   *  Lip 7  10/13/2024  RPR *   *    MCV 87  10/15/2024   NH4 *   *  UPT *   *      PETH *   *  THC Presumptive Positive (A)  10/14/2024    ETOH *   *  KETTY Negative  10/14/2024    EtG *   *  AMP Negative  10/14/2024    ALC <10  10/14/2024  OPI Presumptive Positive (A)  10/14/2024    BZO Negative  10/14/2024  MTD Negative  10/14/2024     BAR Negative  10/14/2024  BUP *   *    PCP Negative  10/14/2024  FEN Presumptive Positive (A)  10/14/2024     Results for orders placed or performed during the hospital encounter of 10/13/24   EKG 12-lead    Collection Time: 10/13/24  8:53 PM   Result Value Ref Range    QRS Duration 86 ms    [facility] QTC Calculation 426 ms    Narrative    Test Reason : K92.2,    Vent. Rate : 075 BPM     Atrial Rate : 075 BPM     P-R Int : 150 ms          QRS Dur : 086 ms      QT Int : 382 ms       P-R-T Axes : 054 066 060 degrees     QTc Int : 426 ms    Normal sinus rhythm  Normal ECG  When compared with ECG of  05-AUG-2024 02:05,  No significant change was found  Confirmed by Gasper GARVIN, Roel ([id number]) on 10/14/2024 7:48:06 AM    Referred By: [email or web address]   SELF           Confirmed By:Roel Jackman MD        ALCOCER & LINKS:        Y  = yes/endorses     N  = no/denies     U  = unknown/unable to assess    ADHD   AIMS   AUDIT   AUDIT-C   C-SSRS (Screen)   C-SSRS (Short)   C-SSRS (Full)   DAST   DAST-10   HEIDI-7   MoCA   PCL-5   PHQ-9   CODY   YMRS     Consults  [facility] ENDOSCOPY 2ND FLR

## 2024-10-15 NOTE — ASSESSMENT & PLAN NOTE
Patient's hemorrhage is due to gastrointestinal bleed, patient does not have a propensity for bleeding.. Patients most recent Hgb, Hct, platelets, and INR are listed below.  Recent Labs     10/13/24  2130 10/14/24  0153 10/14/24  0913 10/15/24  0430   HGB 12.7*  --  11.6* 11.6*   HCT 39.1*  --  35.7* 35.8*     --  253 237   INR  --  1.0  --   --      -CTA abd/pelvis with findings suggestive of a nonspecific colitis involving the descending and rectosigmoid colon. No brisk active GI bleed. Hepatosplenomegaly. Age advanced degenerative changes in both hips.    Plan  - Will trend hemoglobin/hematocrit qd  - Will monitor and correct any coagulation defects  - Will transfuse if Hgb is <7g/dl (<8g/dl in cases of active ACS) or if patient has rapid bleeding leading to hemodynamic instability  - Started on GIB pathway.  - Use IV anti-emetics as needed.   - EGD on 10/14 without significant findings  -plan for colonoscopy on 10/15 with GI, results pending

## 2024-10-15 NOTE — TRANSFER OF CARE
Anesthesia Transfer of Care Note    Patient: Blu Hart    Procedure(s) Performed: Procedure(s) (LRB):  COLONOSCOPY (N/A)    Patient location: PACU    Anesthesia Type: general    Transport from OR: Transported from OR on room air with adequate spontaneous ventilation    Post pain: adequate analgesia    Post assessment: no apparent anesthetic complications    Post vital signs: stable    Level of consciousness: responds to stimulation and awake    Nausea/Vomiting: no nausea/vomiting    Complications: none    Transfer of care protocol was followed      Last vitals: Visit Vitals  /67   Pulse 63   Temp 36   Resp 14   Ht    Wt    SpO2 99%   BMI

## 2024-10-15 NOTE — OR NURSING
Dr. Velasquez notified that patient reports taking only 50% of prep, but reports clear results with some blood.

## 2024-10-15 NOTE — SUBJECTIVE & OBJECTIVE
Interval History: feeling some better today. Nervous about getting colonoscopy done and what they might find. Interested in getting to feel better.    Review of Systems   Constitutional:  Negative for fatigue and fever.   Respiratory:  Negative for cough and shortness of breath.    Cardiovascular:  Negative for chest pain.   Gastrointestinal:  Positive for blood in stool and diarrhea. Negative for nausea and vomiting.     Objective:     Vital Signs (Most Recent):  Temp: 98.2 °F (36.8 °C) (10/15/24 0935)  Pulse: 81 (10/15/24 0935)  Resp: 16 (10/15/24 0935)  BP: (!) 141/77 (10/15/24 0936)  SpO2: 98 % (10/15/24 0935) Vital Signs (24h Range):  Temp:  [97.9 °F (36.6 °C)-100.8 °F (38.2 °C)] 98.2 °F (36.8 °C)  Pulse:  [] 81  Resp:  [14-20] 16  SpO2:  [94 %-100 %] 98 %  BP: ()/(52-86) 141/77     Weight: 117.9 kg (260 lb)  Body mass index is 36.26 kg/m².    Intake/Output Summary (Last 24 hours) at 10/15/2024 1135  Last data filed at 10/14/2024 2000  Gross per 24 hour   Intake 480 ml   Output --   Net 480 ml         Physical Exam  Constitutional:       Appearance: Normal appearance.   HENT:      Head: Normocephalic and atraumatic.   Cardiovascular:      Rate and Rhythm: Normal rate.      Pulses: Normal pulses.      Heart sounds: No murmur heard.  Pulmonary:      Effort: Pulmonary effort is normal. No respiratory distress.      Breath sounds: Normal breath sounds.   Abdominal:      General: Abdomen is flat. Bowel sounds are normal. There is no distension.   Skin:     General: Skin is warm and dry.   Neurological:      Mental Status: He is alert.             Significant Labs: All pertinent labs within the past 24 hours have been reviewed.    Significant Imaging: I have reviewed all pertinent imaging results/findings within the past 24 hours.

## 2024-10-15 NOTE — H&P
Short Stay Endoscopy History and Physical    PCP - No, Primary Doctor  Referring Physician - aRvin Shanks, DO  [address] [place] [address]    Procedure - colonoscopy  ASA - per anesthesia  Mallampati - per anesthesia  History of Anesthesia problems - per anesthesia  Family history Anesthesia problems -  per anesthesia   Plan of anesthesia - per anesthesia    HPI:  This is a 39 y.o. male here for evaluation of: hematochezia    Reflux - no  Dysphagia - no  Abdominal pain - no  Diarrhea - no    ROS:  Constitutional: No fevers, chills, No weight loss  CV: No chest pain  Pulm: No cough, No shortness of breath  Ophtho: No vision changes  GI: see HPI  Derm: No rash    Medical History:  has a past medical history of Chronic back pain, Nicotine dependence, and Polysubstance abuse.    Surgical History:  has a past surgical history that includes Esophagogastroduodenoscopy (N/A, 10/14/2024).    Family History: family history is not on file..    Social History:  reports that he has been smoking cigarettes. He has never used smokeless tobacco. He reports current drug use. Drugs: Methamphetamines, Heroin, and Fentanyl. He reports that he does not drink alcohol.    Review of patient's allergies indicates:  No Known Allergies    Medications:   Medications Prior to Admission   Medication Sig Dispense Refill Last Dose/Taking    ondansetron (ZOFRAN-ODT) 4 MG TbDL Take 1 tablet (4 mg total) by mouth every 6 (six) hours as needed (nausea). 15 tablet 0     polyethylene glycol (GLYCOLAX) 17 gram/dose powder Use to cap to measure 17g, mix with liquid, and take by mouth once daily. 510 g 0        Physical Exam:    Vital Signs:   Vitals:    10/15/24 0936   BP: (!) 141/77   Pulse:    Resp:    Temp:        General Appearance: Well appearing in no acute distress    Labs:  Lab Results   Component Value Date    WBC 9.56 10/15/2024    HGB 11.6 (L) 10/15/2024    HCT 35.8 (L) 10/15/2024     10/15/2024    CHOL 150 07/19/2023     TRIG 105 07/19/2023    HDL 39 (L) 07/19/2023    ALT 8 (L) 10/15/2024    AST 10 10/15/2024     10/15/2024    K 3.5 10/15/2024     10/15/2024    CREATININE 0.7 10/15/2024    BUN 6 10/15/2024    CO2 27 10/15/2024    TSH 0.65 12/17/2023    INR 1.0 10/14/2024       I have explained the risks and benefits of this endoscopic procedure to the patient including but not limited to bleeding, inflammation, infection, perforation, missing a lesion and death.      Amanda Rodas MD

## 2024-10-15 NOTE — PROVATION PATIENT INSTRUCTIONS
Discharge Summary/Instructions after an Endoscopic Procedure  Patient Name: Blu Hart  Patient MRN: [id number]  Patient YOB: 1985  Tuesday, October 15, 2024  Tez Velasquez MD  Dear patient,  As a result of recent federal legislation (The Federal Cures Act), you may   receive lab or pathology results from your procedure in your [facility]   account before your physician is able to contact you. Your physician or   their representative will relay the results to you with their   recommendations at their soonest availability.  Thank you,  RESTRICTIONS:  During your procedure today, you received medications for sedation.  These   medications may affect your judgment, balance and coordination.  Therefore,   for 24 hours, you have the following restrictions:   - DO NOT drive a car, operate machinery, make legal/financial decisions,   sign important papers or drink alcohol.    ACTIVITY:  Today: no heavy lifting, straining or running due to procedural   sedation/anesthesia.  The following day: return to full activity including work.  DIET:  Eat and drink normally unless instructed otherwise.     TREATMENT FOR COMMON SIDE EFFECTS:  - Mild abdominal pain, nausea, belching, bloating or excessive gas:  rest,   eat lightly and use a heating pad.  - Sore Throat: treat with throat lozenges and/or gargle with warm salt   water.  - Because air was used during the procedure, expelling large amounts of air   from your rectum or belching is normal.  - If a bowel prep was taken, you may not have a bowel movement for 1-3 days.    This is normal.  SYMPTOMS TO WATCH FOR AND REPORT TO YOUR PHYSICIAN:  1. Abdominal pain or bloating, other than gas cramps.  2. Chest pain.  3. Back pain.  4. Signs of infection such as: chills or fever occurring within 24 hours   after the procedure.  5. Rectal bleeding, which would show as bright red, maroon, or black stools.   (A tablespoon of blood from the rectum is not serious, especially  if   hemorrhoids are present.)  6. Vomiting.  7. Weakness or dizziness.  GO DIRECTLY TO THE NEAREST EMERGENCY ROOM IF YOU HAVE ANY OF THE FOLLOWING:      Difficulty breathing              Chills and/or fever over 101 F   Persistent vomiting and/or vomiting blood   Severe abdominal pain   Severe chest pain   Black, tarry stools   Bleeding- more than one tablespoon   Any other symptom or condition that you feel may need urgent attention  Your doctor recommends these additional instructions:  If any biopsies were taken, your doctors clinic will contact you in 1 to 2   weeks with any results.  - Return patient to hospital [facility] for ongoing care.   - Advance diet as tolerated.   - Continue present medications.   - Await pathology results.   - Continue supportive care.    - Substance abuse counseling and assistance.   - Repeat colonoscopy in 3 months to check healing.  For questions, problems or results please call your physician - Tez Velasquez MD at Work:  [phone].  [facility], EMERGENCY ROOM PHONE NUMBER: [phone]  IF A COMPLICATION OR EMERGENCY SITUATION ARISES AND YOU ARE UNABLE TO REACH   YOUR PHYSICIAN - GO DIRECTLY TO THE EMERGENCY ROOM.  Tez Velasquez MD  10/15/2024 12:31:19 PM  This report has been verified and signed electronically.  Dear patient,  As a result of recent federal legislation (The Federal Cures Act), you may   receive lab or pathology results from your procedure in your [facility]   account before your physician is able to contact you. Your physician or   their representative will relay the results to you with their   recommendations at their soonest availability.  Thank you,  PROVATION

## 2024-10-15 NOTE — PROGRESS NOTES
[facility] Medicine  Progress Note    Patient Name: Blu Hart  MRN: [id number]  Patient Class: OP- Observation   Admission Date: 10/13/2024  Length of Stay: 0 days  Attending Physician: Ravin Shanks DO  Primary Care Provider: Adelaida, Primary Doctor        Subjective:     Principal Problem:GI bleed        HPI:  Blu Hart is a 39 y.o. male with a PMHx chronic back pain, nicotine dependence, and polysubstance abuse who presents to the ED with complaints of abdominal pain, rectal bleeding, and hematemesis x2 days. The patient reports he initially had diarrhea and lower abdominal pain 3 days ago which he thought was due to having ice cream because he is lactose intolerant. He states his symptoms worsened the next day and he began having multiple episodes of bloody diarrhea, noting both bright red blood and darker maroon stools. He states later that night he had an episode of emesis that he describes as dark, coffee-ground and reports an additional episode of coffee-ground emesis a few hours prior to coming to the ED. He vomited in the ED as well but did not note any blood. He reports the abdominal pain is mostly lower and is cramping with intermittent stabbing pain. He also endorses associated nausea, diaphoresis, chills, and lightheadedness. He denies fever, CP, SOB, cough, syncope, or dysuria. He denies EtOH use but reports taking 800mg of ibuprofen 4-5x/daily for the past 2 weeks. He also endorses IVDU and typically uses heroin and fentanyl multiple times per day and last used this morning.    In the ED, patient initially hypertensive and tachycardic which improved with pain control otherwise vitals stable, afebrile. CBC with stable anemia. Lipase 7. CMP unremarkable. Type & screen obtained. CTA abd/pelvis with findings suggestive of a nonspecific colitis involving the descending and rectosigmoid colon. No brisk active GI bleed. Hepatosplenomegaly. Age advanced degenerative changes in  both hips. The patient received 4mg IV morphine, zofran 8mg x1, and protonix 80mg.    Overview/Hospital Course:  Patient went with GI for EGD without findings. Plan for inpatient prep and colonoscopy to further evaluate. Working with addiction medicine for opioid use treatment and possible rehab placement if he is interested    Interval History: feeling some better today. Nervous about getting colonoscopy done and what they might find. Interested in getting to feel better.    Review of Systems   Constitutional:  Negative for fatigue and fever.   Respiratory:  Negative for cough and shortness of breath.    Cardiovascular:  Negative for chest pain.   Gastrointestinal:  Positive for blood in stool and diarrhea. Negative for nausea and vomiting.     Objective:     Vital Signs (Most Recent):  Temp: 98.2 °F (36.8 °C) (10/15/24 0935)  Pulse: 81 (10/15/24 0935)  Resp: 16 (10/15/24 0935)  BP: (!) 141/77 (10/15/24 0936)  SpO2: 98 % (10/15/24 0935) Vital Signs (24h Range):  Temp:  [97.9 °F (36.6 °C)-100.8 °F (38.2 °C)] 98.2 °F (36.8 °C)  Pulse:  [] 81  Resp:  [14-20] 16  SpO2:  [94 %-100 %] 98 %  BP: ()/(52-86) 141/77     Weight: 117.9 kg (260 lb)  Body mass index is 36.26 kg/m².    Intake/Output Summary (Last 24 hours) at 10/15/2024 1135  Last data filed at 10/14/2024 2000  Gross per 24 hour   Intake 480 ml   Output --   Net 480 ml         Physical Exam  Constitutional:       Appearance: Normal appearance.   HENT:      Head: Normocephalic and atraumatic.   Cardiovascular:      Rate and Rhythm: Normal rate.      Pulses: Normal pulses.      Heart sounds: No murmur heard.  Pulmonary:      Effort: Pulmonary effort is normal. No respiratory distress.      Breath sounds: Normal breath sounds.   Abdominal:      General: Abdomen is flat. Bowel sounds are normal. There is no distension.   Skin:     General: Skin is warm and dry.   Neurological:      Mental Status: He is alert.             Significant Labs: All pertinent  labs within the past 24 hours have been reviewed.    Significant Imaging: I have reviewed all pertinent imaging results/findings within the past 24 hours.    Assessment/Plan:      * GI bleed  Patient's hemorrhage is due to gastrointestinal bleed, patient does not have a propensity for bleeding.. Patients most recent Hgb, Hct, platelets, and INR are listed below.  Recent Labs     10/13/24  2130 10/14/24  0153 10/14/24  0913 10/15/24  0430   HGB 12.7*  --  11.6* 11.6*   HCT 39.1*  --  35.7* 35.8*     --  253 237   INR  --  1.0  --   --      -CTA abd/pelvis with findings suggestive of a nonspecific colitis involving the descending and rectosigmoid colon. No brisk active GI bleed. Hepatosplenomegaly. Age advanced degenerative changes in both hips.    Plan  - Will trend hemoglobin/hematocrit qd  - Will monitor and correct any coagulation defects  - Will transfuse if Hgb is <7g/dl (<8g/dl in cases of active ACS) or if patient has rapid bleeding leading to hemodynamic instability  - Started on GIB pathway.  - Use IV anti-emetics as needed.   - EGD on 10/14 without significant findings  -plan for colonoscopy on 10/15 with GI, results pending    Anemia  Anemia is likely due to acute blood loss which was from GIB vs colitis . Most recent hemoglobin and hematocrit are listed below.  Recent Labs     10/13/24  2130 10/14/24  0913 10/15/24  0430   HGB 12.7* 11.6* 11.6*   HCT 39.1* 35.7* 35.8*     Plan  - Monitor serial CBC: qd  - Transfuse PRBC if patient becomes hemodynamically unstable, symptomatic or H/H drops below 7/21.  - Patient has not received any PRBC transfusions to date  - Patient's anemia is currently stable    BMI 36.0-36.9,adult  Body mass index is 36.26 kg/m². Obesity complicates all aspects of disease management from diagnostic modalities to treatment.     Cigarette nicotine dependence without complication  Dangers of cigarette smoking were reviewed with patient in detail. Patient was Counseled for 3-10  minutes. Nicotine replacement options were discussed. Nicotine replacement was discussed- prescribed    Chronic back pain  -Chronic issue.  -PRN tylenol, lidocaine, and prn zanaflex.    Polysubstance abuse  Pt endorses IVDU, states he typically uses heroin and fentanyl multiple times per day.   -UDS pending.  -PRNs for opiate withdrawal.  -COWS score q4.  -Encouraged cessation from illicit drugs.  -Addiction psychiatry consulted, appreciate assistance.       VTE Risk Mitigation (From admission, onward)           Ordered     IP VTE HIGH RISK PATIENT  Once         10/14/24 0121     Place sequential compression device  Until discontinued         10/14/24 0121                    Discharge Planning   CHIP:      Code Status: Full Code   Is the patient medically ready for discharge?:     Reason for patient still in hospital (select all that apply): Patient trending condition and Consult recommendations  Discharge Plan A: Home with family                  Ravin Shanks DO  Department of Hospital Medicine   [facility]

## 2024-10-15 NOTE — ASSESSMENT & PLAN NOTE
Pt endorses IVDU, states he typically uses heroin and fentanyl multiple times per day.   -UDS pending.  -PRNs for opiate withdrawal.  -COWS score q4.  -Encouraged cessation from illicit drugs.  -Addiction psychiatry consulted, appreciate assistance.

## 2024-10-15 NOTE — ASSESSMENT & PLAN NOTE
Anemia is likely due to acute blood loss which was from GIB vs colitis . Most recent hemoglobin and hematocrit are listed below.  Recent Labs     10/13/24  2130 10/14/24  0913 10/15/24  0430   HGB 12.7* 11.6* 11.6*   HCT 39.1* 35.7* 35.8*     Plan  - Monitor serial CBC: qd  - Transfuse PRBC if patient becomes hemodynamically unstable, symptomatic or H/H drops below 7/21.  - Patient has not received any PRBC transfusions to date  - Patient's anemia is currently stable

## 2024-10-15 NOTE — ANESTHESIA PREPROCEDURE EVALUATION
"[facility]  Anesthesia Pre-Operative Evaluation     Patient Name: Blu Hart  YOB: 1985  MRN: [id number]  [facility]: [id number]       Admit Date: 10/13/2024   Admit Team: [facility]  Hospital Day: 2  Date of Procedure: 10/15/2024  Anesthesia: General/MAC Procedure: Procedure(s) (LRB):  COLONOSCOPY (N/A)  Pre-Operative Diagnosis: Hematochezia [K92.1]  Proceduralist:Surgeons and Role:     * Tez Velasquez MD - Primary  Code Status: Full Code   Advanced Directive: <no information>  Isolation Precautions: No active isolations  Capacity: Full capacity       SUBJECTIVE:     Pre-operative evaluation for Procedure(s) (LRB):  COLONOSCOPY (N/A)  10/14/2024  Hospital LOS: 0 days  ICU LOS: Patient does not have an ICU stay during this admission.    Blu Hart is a 39 y.o. male w/ a significant PMHx of chronic back pain, nicotine dependence, polysubstance abuse, and seizure disorder (per patient and family bedside, no daily medication, last seizure was about a month ago) who presents due to BRBPR and coffee ground emesis. Pt is s/p EGD. Pt to undergo colonoscopy. Hgb 11.6.         Patient now presents for the above procedure(s).    TTE:  No results found for this or any previous visit.  No results found for this or any previous visit.  FADUMO:  No results found for this or any previous visit.  Stress Test:  No results found for this or any previous visit.     Cardiac Catheterization:  No results found for this or any previous visit.     PFT:  No results found for: "FEV1", "FVC", "PXS0VME", "TLC", "DLCO"     NPO Status:     LDA: None documented.       Peripheral IV - Single Lumen 10/13/24 2244 20 G Left Upper Arm (Active)   Site Assessment Clean;Dry;Intact;No redness;No swelling 10/14/24 2000   Extremity Assessment Distal to IV No abnormal discoloration;No redness;No swelling;No warmth 10/14/24 2000   Line Status Saline locked 10/14/24 2000   Dressing Status Clean;Dry;Intact 10/14/24 2000 "   Dressing Intervention Integrity maintained 10/14/24 1530   Reason Not Rotated Not due 10/14/24 1530   Number of days: 0       Vent/Oxygen: None documented           Drips: None documented.      Previous Airway: None documented.    Allergies:  Review of patient's allergies indicates:  No Known Allergies    Medications:     Current Outpatient Medications   Medication Instructions    ondansetron (ZOFRAN-ODT) 4 mg, Oral, Every 6 hours PRN    polyethylene glycol (GLYCOLAX) 17 gram/dose powder Use to cap to measure 17g, mix with liquid, and take by mouth once daily.     Inpatient Medications:   LIDOcaine  2 patch Transdermal Q24H    nicotine  1 patch Transdermal Daily    pantoprazole  40 mg Intravenous BID    sucralfate  1 g Oral Q6H     Antibiotics (From admission, onward)      None          VTE Risk Mitigation (From admission, onward)           Ordered     IP VTE HIGH RISK PATIENT  Once         10/14/24 0121     Place sequential compression device  Until discontinued         10/14/24 0121                    History:     Active Hospital Problems    Diagnosis  POA    *GI bleed [K92.2]  Yes    Chronic back pain [M54.9, G89.29]  Yes    Cigarette nicotine dependence without complication [F17.210]  Yes    Polysubstance abuse [F19.10]  Yes    Anemia [D64.9]  Yes    BMI 36.0-36.9,adult [Z68.36]  Not Applicable    Excessive use of nonsteroidal anti-inflammatory drug (NSAID) [F19.90]  Yes      Resolved Hospital Problems   No resolved problems to display.     Medical History:  History reviewed. No pertinent past medical history.  Surgical History:    has no past surgical history on file.   Social History:    has no history on file for sexual activity.  reports that he has been smoking cigarettes. He has never used smokeless tobacco. He reports current drug use. Drugs: Methamphetamines, Heroin, and Fentanyl. He reports that he does not drink alcohol.    OBJECTIVE:   Vital Signs Range (Last 24H):  Temp:  [36.6 °C (97.9 °F)-37.6 °C  "(99.6 °F)]   Pulse:  [69-99]   Resp:  [10-20]   BP: ()/(50-92)   SpO2:  [94 %-100 %]   Lab Results   Component Value Date    WBC 9.86 10/14/2024    HGB 11.6 (L) 10/14/2024    HCT 35.7 (L) 10/14/2024     10/14/2024     10/14/2024    K 3.6 10/14/2024     10/14/2024    CREATININE 0.7 10/14/2024    BUN 6 10/14/2024    CO2 25 10/14/2024     10/14/2024    CALCIUM 9.4 10/14/2024    MG 1.9 10/14/2024    ALKPHOS 74 10/14/2024    ALT 9 (L) 10/14/2024    AST 11 10/14/2024    ALBUMIN 3.4 (L) 10/14/2024    INR 1.0 10/14/2024    APTT 28.9 10/14/2024     03/26/2023    CPKMB 1.7 12/03/2021     Recent Labs     10/13/24  2130 10/14/24  0153 10/14/24  0913   WBC 10.55  --  9.86   HGB 12.7*  --  11.6*   HCT 39.1*  --  35.7*     --  253    138  --    K 3.9 3.6  --    CREATININE 0.8 0.7  --    GLU 97 106  --    INR  --  1.0  --      No results for input(s): "PH", "PCO2", "PO2", "HCO3", "POCSATURATED", "BE" in the last 72 hours.   No LMP for male patient.    Please see Results Review for additional labs & imaging.     EKG:   Results for orders placed or performed during the hospital encounter of 10/13/24   EKG 12-lead    Collection Time: 10/13/24  8:53 PM   Result Value Ref Range    QRS Duration 86 ms    [facility] QTC Calculation 426 ms    Narrative    Test Reason : K92.2,    Vent. Rate : 075 BPM     Atrial Rate : 075 BPM     P-R Int : 150 ms          QRS Dur : 086 ms      QT Int : 382 ms       P-R-T Axes : 054 066 060 degrees     QTc Int : 426 ms    Normal sinus rhythm  Normal ECG  When compared with ECG of 05-AUG-2024 02:05,  No significant change was found  Confirmed by Roel Jackman MD ([id number]) on 10/14/2024 7:48:06 AM    Referred By: [email or web address]   SELF           Confirmed By:Roel Jackman MD       ECHO:  See subjective, if available.    ASSESSMENT/PLAN:       Pre-op Assessment    I have reviewed the Patient Summary Reports.     I have reviewed the Nursing Notes. I have reviewed the NPO " "Status.   I have reviewed the Medications.     Review of Systems  Anesthesia Hx:  No problems with previous Anesthesia   History of prior surgery of interest to airway management or planning:          Denies Family Hx of Anesthesia complications.    Denies Personal Hx of Anesthesia complications.                    Social:  Smoker, Recreational Drugs       Hematology/Oncology:       -- Anemia:                  Denies Current/Recent Cancer                Cardiovascular:      Denies Hypertension.       Denies Angina.        ECG has been reviewed.                          Pulmonary:       Denies Shortness of breath.  Denies Recent URI.                 Renal/:  Renal/ Normal                 Hepatic/GI:      Denies GERD. Denies Liver Disease.  Coffee ground emesis, BRBPR          Neurological:    Denies CVA.    Seizures          Chronic Pain Syndrome (chronic back pain)                         Endocrine:  Endocrine Normal Denies Diabetes.         Obesity / BMI > 30      Physical Exam  General: Well nourished, Alert, Cooperative and Oriented    Airway:  Mallampati: II   Mouth Opening: Normal  TM Distance: Normal  Tongue: Normal  Neck ROM: Normal ROM    Dental:  Periodontal disease  Multiple missing teeth, none reported loose but "brittle"      Anesthesia Plan  Type of Anesthesia, risks & benefits discussed:    Anesthesia Type: Gen ETT, Gen Natural Airway, MAC  Intra-op Monitoring Plan: Standard ASA Monitors  Post Op Pain Control Plan: multimodal analgesia and IV/PO Opioids PRN  Induction:  IV  Airway Plan: Direct and Video  Informed Consent: Informed consent signed with the Patient and all parties understand the risks and agree with anesthesia plan.  All questions answered. Patient consented to blood products? Yes  ASA Score: 3  Day of Surgery Review of History & Physical: H&P Update referred to the surgeon/provider.    Ready For Surgery From Anesthesia Perspective.     .      "

## 2024-10-16 LAB
ALBUMIN SERPL BCP-MCNC: 3 G/DL (ref 3.5–5.2)
ALP SERPL-CCNC: 63 U/L (ref 55–135)
ALT SERPL W/O P-5'-P-CCNC: 6 U/L (ref 10–44)
ANION GAP SERPL CALC-SCNC: 11 MMOL/L (ref 8–16)
AST SERPL-CCNC: 9 U/L (ref 10–40)
BASOPHILS # BLD AUTO: 0.03 K/UL (ref 0–0.2)
BASOPHILS NFR BLD: 0.4 % (ref 0–1.9)
BILIRUB SERPL-MCNC: 0.2 MG/DL (ref 0.1–1)
BUN SERPL-MCNC: 11 MG/DL (ref 6–20)
CALCIUM SERPL-MCNC: 8.9 MG/DL (ref 8.7–10.5)
CHLORIDE SERPL-SCNC: 103 MMOL/L (ref 95–110)
CO2 SERPL-SCNC: 24 MMOL/L (ref 23–29)
CREAT SERPL-MCNC: 0.8 MG/DL (ref 0.5–1.4)
DIFFERENTIAL METHOD BLD: ABNORMAL
EOSINOPHIL # BLD AUTO: 0.2 K/UL (ref 0–0.5)
EOSINOPHIL NFR BLD: 3.3 % (ref 0–8)
ERYTHROCYTE [DISTWIDTH] IN BLOOD BY AUTOMATED COUNT: 13.2 % (ref 11.5–14.5)
EST. GFR  (NO RACE VARIABLE): >60 ML/MIN/1.73 M^2
FINAL PATHOLOGIC DIAGNOSIS: NORMAL
GLUCOSE SERPL-MCNC: 90 MG/DL (ref 70–110)
GROSS: NORMAL
HCT VFR BLD AUTO: 33 % (ref 40–54)
HGB BLD-MCNC: 10.8 G/DL (ref 14–18)
IMM GRANULOCYTES # BLD AUTO: 0.02 K/UL (ref 0–0.04)
IMM GRANULOCYTES NFR BLD AUTO: 0.3 % (ref 0–0.5)
LYMPHOCYTES # BLD AUTO: 2.4 K/UL (ref 1–4.8)
LYMPHOCYTES NFR BLD: 33.6 % (ref 18–48)
Lab: NORMAL
MAGNESIUM SERPL-MCNC: 1.8 MG/DL (ref 1.6–2.6)
MCH RBC QN AUTO: 28.3 PG (ref 27–31)
MCHC RBC AUTO-ENTMCNC: 32.7 G/DL (ref 32–36)
MCV RBC AUTO: 86 FL (ref 82–98)
MONOCYTES # BLD AUTO: 0.6 K/UL (ref 0.3–1)
MONOCYTES NFR BLD: 8.3 % (ref 4–15)
NEUTROPHILS # BLD AUTO: 3.9 K/UL (ref 1.8–7.7)
NEUTROPHILS NFR BLD: 54.1 % (ref 38–73)
NRBC BLD-RTO: 0 /100 WBC
PLATELET # BLD AUTO: 225 K/UL (ref 150–450)
PMV BLD AUTO: 10.5 FL (ref 9.2–12.9)
POTASSIUM SERPL-SCNC: 3.5 MMOL/L (ref 3.5–5.1)
PROT SERPL-MCNC: 6.5 G/DL (ref 6–8.4)
RBC # BLD AUTO: 3.82 M/UL (ref 4.6–6.2)
SODIUM SERPL-SCNC: 138 MMOL/L (ref 136–145)
WBC # BLD AUTO: 7.26 K/UL (ref 3.9–12.7)

## 2024-10-16 PROCEDURE — 96376 TX/PRO/DX INJ SAME DRUG ADON: CPT

## 2024-10-16 PROCEDURE — 63600175 PHARM REV CODE 636 W HCPCS: Performed by: NURSE PRACTITIONER

## 2024-10-16 PROCEDURE — 63600175 PHARM REV CODE 636 W HCPCS

## 2024-10-16 PROCEDURE — 80053 COMPREHEN METABOLIC PANEL: CPT | Performed by: NURSE PRACTITIONER

## 2024-10-16 PROCEDURE — 63600175 PHARM REV CODE 636 W HCPCS: Performed by: PHYSICIAN ASSISTANT

## 2024-10-16 PROCEDURE — 83735 ASSAY OF MAGNESIUM: CPT | Performed by: NURSE PRACTITIONER

## 2024-10-16 PROCEDURE — 11000001 HC ACUTE MED/SURG PRIVATE ROOM

## 2024-10-16 PROCEDURE — 96372 THER/PROPH/DIAG INJ SC/IM: CPT | Performed by: PHYSICIAN ASSISTANT

## 2024-10-16 PROCEDURE — 96366 THER/PROPH/DIAG IV INF ADDON: CPT

## 2024-10-16 PROCEDURE — 25000003 PHARM REV CODE 250: Performed by: NURSE PRACTITIONER

## 2024-10-16 PROCEDURE — 25000003 PHARM REV CODE 250

## 2024-10-16 PROCEDURE — S4991 NICOTINE PATCH NONLEGEND: HCPCS | Performed by: NURSE PRACTITIONER

## 2024-10-16 PROCEDURE — 85025 COMPLETE CBC W/AUTO DIFF WBC: CPT

## 2024-10-16 PROCEDURE — 36415 COLL VENOUS BLD VENIPUNCTURE: CPT | Performed by: NURSE PRACTITIONER

## 2024-10-16 RX ORDER — BUPRENORPHINE AND NALOXONE 2; .5 MG/1; MG/1
2 FILM, SOLUBLE BUCCAL; SUBLINGUAL 2 TIMES DAILY
Status: DISCONTINUED | OUTPATIENT
Start: 2024-10-16 | End: 2024-10-17

## 2024-10-16 RX ORDER — KETOROLAC TROMETHAMINE 15 MG/ML
15 INJECTION, SOLUTION INTRAMUSCULAR; INTRAVENOUS ONCE
Status: COMPLETED | OUTPATIENT
Start: 2024-10-16 | End: 2024-10-16

## 2024-10-16 RX ADMIN — PIPERACILLIN SODIUM AND TAZOBACTAM SODIUM 4.5 G: 4; .5 INJECTION, POWDER, FOR SOLUTION INTRAVENOUS at 11:10

## 2024-10-16 RX ADMIN — Medication 1 PATCH: at 08:10

## 2024-10-16 RX ADMIN — ACETAMINOPHEN 650 MG: 325 TABLET ORAL at 05:10

## 2024-10-16 RX ADMIN — KETOROLAC TROMETHAMINE 15 MG: 15 INJECTION, SOLUTION INTRAMUSCULAR; INTRAVENOUS at 01:10

## 2024-10-16 RX ADMIN — PANTOPRAZOLE SODIUM 40 MG: 40 INJECTION, POWDER, LYOPHILIZED, FOR SOLUTION INTRAVENOUS at 08:10

## 2024-10-16 RX ADMIN — SUCRALFATE 1 G: 1 SUSPENSION ORAL at 11:10

## 2024-10-16 RX ADMIN — SUCRALFATE 1 G: 1 SUSPENSION ORAL at 05:10

## 2024-10-16 RX ADMIN — PIPERACILLIN SODIUM AND TAZOBACTAM SODIUM 4.5 G: 4; .5 INJECTION, POWDER, FOR SOLUTION INTRAVENOUS at 08:10

## 2024-10-16 RX ADMIN — SUCRALFATE 1 G: 1 SUSPENSION ORAL at 06:10

## 2024-10-16 NOTE — PROGRESS NOTES
[facility]y [facility]ae - Internal Medicine [facility] Medicine  Progress Note    Patient Name: Blu Hart  MRN: [id number]  Patient Class: OP- Observation   Admission Date: 10/13/2024  Length of Stay: 0 days  Attending Physician: Ravin Shanks DO  Primary Care Provider: Adelaida, Primary Doctor        Subjective:     Principal Problem:GI bleed        HPI:  Blu Hart is a 39 y.o. male with a PMHx chronic back pain, nicotine dependence, and polysubstance abuse who presents to the ED with complaints of abdominal pain, rectal bleeding, and hematemesis x2 days. The patient reports he initially had diarrhea and lower abdominal pain 3 days ago which he thought was due to having ice cream because he is lactose intolerant. He states his symptoms worsened the next day and he began having multiple episodes of bloody diarrhea, noting both bright red blood and darker maroon stools. He states later that night he had an episode of emesis that he describes as dark, coffee-ground and reports an additional episode of coffee-ground emesis a few hours prior to coming to the ED. He vomited in the ED as well but did not note any blood. He reports the abdominal pain is mostly lower and is cramping with intermittent stabbing pain. He also endorses associated nausea, diaphoresis, chills, and lightheadedness. He denies fever, CP, SOB, cough, syncope, or dysuria. He denies EtOH use but reports taking 800mg of ibuprofen 4-5x/daily for the past 2 weeks. He also endorses IVDU and typically uses heroin and fentanyl multiple times per day and last used this morning.    In the ED, patient initially hypertensive and tachycardic which improved with pain control otherwise vitals stable, afebrile. CBC with stable anemia. Lipase 7. CMP unremarkable. Type & screen obtained. CTA abd/pelvis with findings suggestive of a nonspecific colitis involving the descending and rectosigmoid colon. No brisk active GI bleed. Hepatosplenomegaly. Age advanced  degenerative changes in both hips. The patient received 4mg IV morphine, zofran 8mg x1, and protonix 80mg.    Overview/Hospital Course:  Patient went with GI for EGD without findings. Colonoscopy showed area of ischemia and concern for infection in the area due to bleeding and mucosal breakdown. Plan to start several days of IV Zosyn, then transition to PO antibiotics on discharge per GI recommendations.   After discussion with patient and addiction team, plan to start suboxone 4/1 BID on 10/16 for opioid use disorder, will monitor response to treatment    Interval History: feels some better today, still having some bloody BM and abdominal pain/cramping. Wants to start suboxone today     Review of Systems   Constitutional:  Negative for fatigue and fever.   Respiratory:  Negative for cough and shortness of breath.    Cardiovascular:  Negative for chest pain.   Gastrointestinal:  Positive for abdominal pain and blood in stool. Negative for constipation, diarrhea, nausea and vomiting.     Objective:     Vital Signs (Most Recent):  Temp: 97.5 °F (36.4 °C) (10/16/24 0751)  Pulse: 74 (10/16/24 0751)  Resp: 18 (10/16/24 0751)  BP: 123/80 (10/16/24 0751)  SpO2: 99 % (10/16/24 0751) Vital Signs (24h Range):  Temp:  [97.5 °F (36.4 °C)-98.4 °F (36.9 °C)] 97.5 °F (36.4 °C)  Pulse:  [55-92] 74  Resp:  [10-18] 18  SpO2:  [96 %-100 %] 99 %  BP: (104-123)/(58-80) 123/80     Weight: 117.9 kg (260 lb)  Body mass index is 36.26 kg/m².    Intake/Output Summary (Last 24 hours) at 10/16/2024 1145  Last data filed at 10/16/2024 1017  Gross per 24 hour   Intake 120 ml   Output --   Net 120 ml         Physical Exam  Constitutional:       Appearance: Normal appearance.   HENT:      Head: Normocephalic and atraumatic.   Cardiovascular:      Rate and Rhythm: Normal rate and regular rhythm.      Pulses: Normal pulses.      Heart sounds: No murmur heard.  Pulmonary:      Effort: Pulmonary effort is normal. No respiratory distress.   Abdominal:       General: Abdomen is flat. Bowel sounds are normal. There is no distension.      Palpations: Abdomen is soft.   Skin:     General: Skin is warm and dry.   Neurological:      Mental Status: He is alert.             Significant Labs: All pertinent labs within the past 24 hours have been reviewed.    Significant Imaging: I have reviewed all pertinent imaging results/findings within the past 24 hours.    Assessment/Plan:      * GI bleed  Patient's hemorrhage is due to gastrointestinal bleed, patient does not have a propensity for bleeding.. Patients most recent Hgb, Hct, platelets, and INR are listed below.  Recent Labs     10/14/24  0153 10/14/24  0913 10/15/24  0430 10/16/24  0335   HGB  --  11.6* 11.6* 10.8*   HCT  --  35.7* 35.8* 33.0*   PLT  --  253 237 225   INR 1.0  --   --   --      -CTA abd/pelvis with findings suggestive of a nonspecific colitis involving the descending and rectosigmoid colon. No brisk active GI bleed. Hepatosplenomegaly. Age advanced degenerative changes in both hips.    Plan  - Will trend hemoglobin/hematocrit qd  - Will monitor and correct any coagulation defects  - Will transfuse if Hgb is <7g/dl (<8g/dl in cases of active ACS) or if patient has rapid bleeding leading to hemodynamic instability  - Started on GIB pathway.  - Use IV anti-emetics as needed.   - EGD on 10/14 without significant findings  - colonoscopy done 10/15, showed significant ischemic changes, concern for infection as well. Plan for several days of IV Zosyn then transition to oral antibiotics for 2 weeks. Plan to transition to Augmentin on d/c if patient is stable and complete 10 days of abx total     Anemia  Anemia is likely due to acute blood loss which was from GIB vs colitis . Most recent hemoglobin and hematocrit are listed below.  Recent Labs     10/14/24  0913 10/15/24  0430 10/16/24  0335   HGB 11.6* 11.6* 10.8*   HCT 35.7* 35.8* 33.0*     Plan  - Monitor serial CBC: qd  - Transfuse PRBC if patient becomes  hemodynamically unstable, symptomatic or H/H drops below 7/21.  - Patient has not received any PRBC transfusions to date  - Patient's anemia is currently stable, with slight down trend, continue to monitor on daily H/H    BMI 36.0-36.9,adult  Body mass index is 36.26 kg/m². Obesity complicates all aspects of disease management from diagnostic modalities to treatment.     Cigarette nicotine dependence without complication  Dangers of cigarette smoking were reviewed with patient in detail. Patient was Counseled for 3-10 minutes. Nicotine replacement options were discussed. Nicotine replacement was discussed- prescribed    Chronic back pain  -Chronic issue.  -PRN tylenol, lidocaine, and prn zanaflex.    Polysubstance abuse  Pt endorses IVDU, states he typically uses heroin and fentanyl multiple times per day.   -UDS pending.  -PRNs for opiate withdrawal.  -COWS score q4.  -Encouraged cessation from illicit drugs.  -Addiction psychiatry consulted, appreciate assistance. Recs for starting suboxone 4/1 BID today and monitoring response      VTE Risk Mitigation (From admission, onward)           Ordered     IP VTE HIGH RISK PATIENT  Once         10/14/24 0121     Place sequential compression device  Until discontinued         10/14/24 0121                    Discharge Planning   CHIP: 10/18/2024     Code Status: Full Code   Is the patient medically ready for discharge?:     Reason for patient still in hospital (select all that apply): Patient trending condition and Consult recommendations  Discharge Plan A: Home with family                  Ravin Shanks DO  Department of Hospital Medicine   [facility] - Internal Medicine Telemetry

## 2024-10-16 NOTE — ASSESSMENT & PLAN NOTE
Patient's hemorrhage is due to gastrointestinal bleed, patient does not have a propensity for bleeding.. Patients most recent Hgb, Hct, platelets, and INR are listed below.  Recent Labs     10/14/24  0153 10/14/24  0913 10/15/24  0430 10/16/24  0335   HGB  --  11.6* 11.6* 10.8*   HCT  --  35.7* 35.8* 33.0*   PLT  --  253 237 225   INR 1.0  --   --   --      -CTA abd/pelvis with findings suggestive of a nonspecific colitis involving the descending and rectosigmoid colon. No brisk active GI bleed. Hepatosplenomegaly. Age advanced degenerative changes in both hips.    Plan  - Will trend hemoglobin/hematocrit qd  - Will monitor and correct any coagulation defects  - Will transfuse if Hgb is <7g/dl (<8g/dl in cases of active ACS) or if patient has rapid bleeding leading to hemodynamic instability  - Started on GIB pathway.  - Use IV anti-emetics as needed.   - EGD on 10/14 without significant findings  - colonoscopy done 10/15, showed significant ischemic changes, concern for infection as well. Plan for several days of IV Zosyn then transition to oral antibiotics for 2 weeks. Plan to transition to Augmentin on d/c if patient is stable and complete 10 days of abx total

## 2024-10-16 NOTE — ASSESSMENT & PLAN NOTE
Pt endorses IVDU, states he typically uses heroin and fentanyl multiple times per day.   -UDS pending.  -PRNs for opiate withdrawal.  -COWS score q4.  -Encouraged cessation from illicit drugs.  -Addiction psychiatry consulted, appreciate assistance. Recs for starting suboxone 4/1 BID today and monitoring response

## 2024-10-16 NOTE — ASSESSMENT & PLAN NOTE
Anemia is likely due to acute blood loss which was from GIB vs colitis . Most recent hemoglobin and hematocrit are listed below.  Recent Labs     10/14/24  0913 10/15/24  0430 10/16/24  0335   HGB 11.6* 11.6* 10.8*   HCT 35.7* 35.8* 33.0*     Plan  - Monitor serial CBC: qd  - Transfuse PRBC if patient becomes hemodynamically unstable, symptomatic or H/H drops below 7/21.  - Patient has not received any PRBC transfusions to date  - Patient's anemia is currently stable, with slight down trend, continue to monitor on daily H/H

## 2024-10-16 NOTE — PLAN OF CARE
Problem: Adult Inpatient Plan of Care  Goal: Plan of Care Review  Outcome: Progressing  Flowsheets (Taken 10/16/2024 9063)  Plan of Care Reviewed With:   patient   spouse  Goal: Patient-Specific Goal (Individualized)  Outcome: Progressing  Goal: Absence of Hospital-Acquired Illness or Injury  Outcome: Progressing  Goal: Optimal Comfort and Wellbeing  Outcome: Progressing  Goal: Readiness for Transition of Care  Outcome: Progressing     Problem: Gastrointestinal Bleeding  Goal: Optimal Coping with Acute Illness  Outcome: Progressing  Goal: Hemostasis  Outcome: Progressing   Pt AAOX4, continued on iv Protonix and iv abx.  Prn tylenol given pain and discomfort during this shift, VS stable, scheduled meds given. Educated on the use of call light, instructed the pt to call for assitance if needed, call light within reach. No acute events noted during this shift. Plan of care ongoing.

## 2024-10-16 NOTE — SUBJECTIVE & OBJECTIVE
Interval History: feels some better today, still having some bloody BM and abdominal pain/cramping. Wants to start suboxone today     Review of Systems   Constitutional:  Negative for fatigue and fever.   Respiratory:  Negative for cough and shortness of breath.    Cardiovascular:  Negative for chest pain.   Gastrointestinal:  Positive for abdominal pain and blood in stool. Negative for constipation, diarrhea, nausea and vomiting.     Objective:     Vital Signs (Most Recent):  Temp: 97.5 °F (36.4 °C) (10/16/24 0751)  Pulse: 74 (10/16/24 0751)  Resp: 18 (10/16/24 0751)  BP: 123/80 (10/16/24 0751)  SpO2: 99 % (10/16/24 0751) Vital Signs (24h Range):  Temp:  [97.5 °F (36.4 °C)-98.4 °F (36.9 °C)] 97.5 °F (36.4 °C)  Pulse:  [55-92] 74  Resp:  [10-18] 18  SpO2:  [96 %-100 %] 99 %  BP: (104-123)/(58-80) 123/80     Weight: 117.9 kg (260 lb)  Body mass index is 36.26 kg/m².    Intake/Output Summary (Last 24 hours) at 10/16/2024 1145  Last data filed at 10/16/2024 1017  Gross per 24 hour   Intake 120 ml   Output --   Net 120 ml         Physical Exam  Constitutional:       Appearance: Normal appearance.   HENT:      Head: Normocephalic and atraumatic.   Cardiovascular:      Rate and Rhythm: Normal rate and regular rhythm.      Pulses: Normal pulses.      Heart sounds: No murmur heard.  Pulmonary:      Effort: Pulmonary effort is normal. No respiratory distress.   Abdominal:      General: Abdomen is flat. Bowel sounds are normal. There is no distension.      Palpations: Abdomen is soft.   Skin:     General: Skin is warm and dry.   Neurological:      Mental Status: He is alert.             Significant Labs: All pertinent labs within the past 24 hours have been reviewed.    Significant Imaging: I have reviewed all pertinent imaging results/findings within the past 24 hours.

## 2024-10-17 PROBLEM — I80.8 SUPERFICIAL THROMBOPHLEBITIS OF LEFT UPPER EXTREMITY: Status: ACTIVE | Noted: 2024-10-17

## 2024-10-17 LAB
ANION GAP SERPL CALC-SCNC: 12 MMOL/L (ref 8–16)
BASOPHILS # BLD AUTO: 0.03 K/UL (ref 0–0.2)
BASOPHILS NFR BLD: 0.3 % (ref 0–1.9)
BUN SERPL-MCNC: 8 MG/DL (ref 6–20)
CALCIUM SERPL-MCNC: 9.4 MG/DL (ref 8.7–10.5)
CHLORIDE SERPL-SCNC: 105 MMOL/L (ref 95–110)
CO2 SERPL-SCNC: 23 MMOL/L (ref 23–29)
CREAT SERPL-MCNC: 0.9 MG/DL (ref 0.5–1.4)
DIFFERENTIAL METHOD BLD: ABNORMAL
EOSINOPHIL # BLD AUTO: 0.3 K/UL (ref 0–0.5)
EOSINOPHIL NFR BLD: 3.3 % (ref 0–8)
ERYTHROCYTE [DISTWIDTH] IN BLOOD BY AUTOMATED COUNT: 13.2 % (ref 11.5–14.5)
EST. GFR  (NO RACE VARIABLE): >60 ML/MIN/1.73 M^2
GLUCOSE SERPL-MCNC: 105 MG/DL (ref 70–110)
HCT VFR BLD AUTO: 39.3 % (ref 40–54)
HGB BLD-MCNC: 12.8 G/DL (ref 14–18)
IMM GRANULOCYTES # BLD AUTO: 0.02 K/UL (ref 0–0.04)
IMM GRANULOCYTES NFR BLD AUTO: 0.2 % (ref 0–0.5)
LYMPHOCYTES # BLD AUTO: 3 K/UL (ref 1–4.8)
LYMPHOCYTES NFR BLD: 33.7 % (ref 18–48)
MCH RBC QN AUTO: 27.6 PG (ref 27–31)
MCHC RBC AUTO-ENTMCNC: 32.6 G/DL (ref 32–36)
MCV RBC AUTO: 85 FL (ref 82–98)
MONOCYTES # BLD AUTO: 0.5 K/UL (ref 0.3–1)
MONOCYTES NFR BLD: 6.1 % (ref 4–15)
NEUTROPHILS # BLD AUTO: 5 K/UL (ref 1.8–7.7)
NEUTROPHILS NFR BLD: 56.4 % (ref 38–73)
NRBC BLD-RTO: 0 /100 WBC
PLATELET # BLD AUTO: 331 K/UL (ref 150–450)
PMV BLD AUTO: 10.2 FL (ref 9.2–12.9)
POTASSIUM SERPL-SCNC: 3.4 MMOL/L (ref 3.5–5.1)
RBC # BLD AUTO: 4.63 M/UL (ref 4.6–6.2)
SODIUM SERPL-SCNC: 140 MMOL/L (ref 136–145)
WBC # BLD AUTO: 8.9 K/UL (ref 3.9–12.7)

## 2024-10-17 PROCEDURE — 25000003 PHARM REV CODE 250: Performed by: NURSE PRACTITIONER

## 2024-10-17 PROCEDURE — 85025 COMPLETE CBC W/AUTO DIFF WBC: CPT

## 2024-10-17 PROCEDURE — 36415 COLL VENOUS BLD VENIPUNCTURE: CPT

## 2024-10-17 PROCEDURE — 25000003 PHARM REV CODE 250

## 2024-10-17 PROCEDURE — 63600175 PHARM REV CODE 636 W HCPCS

## 2024-10-17 PROCEDURE — 80048 BASIC METABOLIC PNL TOTAL CA: CPT

## 2024-10-17 PROCEDURE — 63600175 PHARM REV CODE 636 W HCPCS: Performed by: NURSE PRACTITIONER

## 2024-10-17 PROCEDURE — 11000001 HC ACUTE MED/SURG PRIVATE ROOM

## 2024-10-17 PROCEDURE — S4991 NICOTINE PATCH NONLEGEND: HCPCS | Performed by: NURSE PRACTITIONER

## 2024-10-17 RX ORDER — NALOXONE HCL 0.4 MG/ML
0.4 VIAL (ML) INJECTION
Status: DISCONTINUED | OUTPATIENT
Start: 2024-10-17 | End: 2024-10-18 | Stop reason: HOSPADM

## 2024-10-17 RX ORDER — LIDOCAINE 50 MG/G
2 PATCH TOPICAL
Status: DISCONTINUED | OUTPATIENT
Start: 2024-10-17 | End: 2024-10-18 | Stop reason: HOSPADM

## 2024-10-17 RX ORDER — SIMETHICONE 80 MG
1 TABLET,CHEWABLE ORAL
Status: DISCONTINUED | OUTPATIENT
Start: 2024-10-17 | End: 2024-10-18 | Stop reason: HOSPADM

## 2024-10-17 RX ORDER — POTASSIUM CHLORIDE 20 MEQ/1
40 TABLET, EXTENDED RELEASE ORAL ONCE
Status: COMPLETED | OUTPATIENT
Start: 2024-10-17 | End: 2024-10-17

## 2024-10-17 RX ORDER — DICYCLOMINE HYDROCHLORIDE 10 MG/1
10 CAPSULE ORAL 4 TIMES DAILY
Status: DISCONTINUED | OUTPATIENT
Start: 2024-10-17 | End: 2024-10-18 | Stop reason: HOSPADM

## 2024-10-17 RX ADMIN — POTASSIUM CHLORIDE 40 MEQ: 1500 TABLET, EXTENDED RELEASE ORAL at 08:10

## 2024-10-17 RX ADMIN — DICYCLOMINE HYDROCHLORIDE 10 MG: 10 CAPSULE ORAL at 06:10

## 2024-10-17 RX ADMIN — LIDOCAINE 2 PATCH: 50 PATCH CUTANEOUS at 10:10

## 2024-10-17 RX ADMIN — PIPERACILLIN SODIUM AND TAZOBACTAM SODIUM 4.5 G: 4; .5 INJECTION, POWDER, FOR SOLUTION INTRAVENOUS at 11:10

## 2024-10-17 RX ADMIN — SIMETHICONE 80 MG: 80 TABLET, CHEWABLE ORAL at 01:10

## 2024-10-17 RX ADMIN — Medication 1 PATCH: at 08:10

## 2024-10-17 RX ADMIN — SUCRALFATE 1 G: 1 SUSPENSION ORAL at 11:10

## 2024-10-17 RX ADMIN — SUCRALFATE 1 G: 1 SUSPENSION ORAL at 12:10

## 2024-10-17 RX ADMIN — DICYCLOMINE HYDROCHLORIDE 10 MG: 10 CAPSULE ORAL at 01:10

## 2024-10-17 RX ADMIN — SIMETHICONE 80 MG: 80 TABLET, CHEWABLE ORAL at 06:10

## 2024-10-17 RX ADMIN — PIPERACILLIN SODIUM AND TAZOBACTAM SODIUM 4.5 G: 4; .5 INJECTION, POWDER, FOR SOLUTION INTRAVENOUS at 10:10

## 2024-10-17 RX ADMIN — PIPERACILLIN SODIUM AND TAZOBACTAM SODIUM 4.5 G: 4; .5 INJECTION, POWDER, FOR SOLUTION INTRAVENOUS at 05:10

## 2024-10-17 RX ADMIN — SUCRALFATE 1 G: 1 SUSPENSION ORAL at 05:10

## 2024-10-17 RX ADMIN — DICYCLOMINE HYDROCHLORIDE 10 MG: 10 CAPSULE ORAL at 10:10

## 2024-10-17 RX ADMIN — SUCRALFATE 1 G: 1 SUSPENSION ORAL at 06:10

## 2024-10-17 RX ADMIN — SIMETHICONE 80 MG: 80 TABLET, CHEWABLE ORAL at 10:10

## 2024-10-17 RX ADMIN — PANTOPRAZOLE SODIUM 40 MG: 40 INJECTION, POWDER, LYOPHILIZED, FOR SOLUTION INTRAVENOUS at 08:10

## 2024-10-17 RX ADMIN — PANTOPRAZOLE SODIUM 40 MG: 40 INJECTION, POWDER, LYOPHILIZED, FOR SOLUTION INTRAVENOUS at 10:10

## 2024-10-17 NOTE — SUBJECTIVE & OBJECTIVE
Interval History: feeling a little better today, still with a lot of stomach pains and upset stomach. But able to tolerate PO intake. Having some left arm pain at prior IV site.     Review of Systems   Constitutional:  Positive for fatigue. Negative for appetite change and fever.   Respiratory:  Negative for cough and shortness of breath.    Cardiovascular:  Negative for chest pain.   Gastrointestinal:  Positive for abdominal pain and blood in stool. Negative for nausea and vomiting.     Objective:     Vital Signs (Most Recent):  Temp: 98.1 °F (36.7 °C) (10/17/24 0724)  Pulse: 91 (10/17/24 0724)  Resp: 16 (10/17/24 0724)  BP: 117/72 (10/17/24 0724)  SpO2: (!) 94 % (10/17/24 0724) Vital Signs (24h Range):  Temp:  [98.1 °F (36.7 °C)-98.7 °F (37.1 °C)] 98.1 °F (36.7 °C)  Pulse:  [77-91] 91  Resp:  [16-19] 16  SpO2:  [94 %-99 %] 94 %  BP: (111-128)/(64-72) 117/72     Weight: 117.9 kg (260 lb)  Body mass index is 36.26 kg/m².  No intake or output data in the 24 hours ending 10/17/24 1055      Physical Exam  Constitutional:       Appearance: Normal appearance.   Cardiovascular:      Rate and Rhythm: Normal rate.      Pulses: Normal pulses.   Pulmonary:      Effort: Pulmonary effort is normal.      Breath sounds: Normal breath sounds.   Abdominal:      General: Abdomen is flat. Bowel sounds are normal. There is no distension.   Skin:     General: Skin is warm and dry.      Comments: RUE with site of warmth/edema with pain around prior IV site   Neurological:      Mental Status: He is alert.             Significant Labs: All pertinent labs within the past 24 hours have been reviewed.    Significant Imaging: I have reviewed all pertinent imaging results/findings within the past 24 hours.

## 2024-10-17 NOTE — ASSESSMENT & PLAN NOTE
Anemia is likely due to acute blood loss which was from GIB vs colitis . Most recent hemoglobin and hematocrit are listed below.  Recent Labs     10/15/24  0430 10/16/24  0335 10/17/24  0223   HGB 11.6* 10.8* 12.8*   HCT 35.8* 33.0* 39.3*     Plan  - Monitor serial CBC: qd  - Transfuse PRBC if patient becomes hemodynamically unstable, symptomatic or H/H drops below 7/21.  - Patient has not received any PRBC transfusions to date  - Patient's anemia is currently stable, with slight down trend, continue to monitor on daily H/H

## 2024-10-17 NOTE — PROGRESS NOTES
[facility]ilar - Internal Medicine [facility] Medicine  Progress Note    Patient Name: Blu Hart  MRN: [id number]  Patient Class: IP- Inpatient   Admission Date: 10/13/2024  Length of Stay: 1 days  Attending Physician: Ravin Shanks DO  Primary Care Provider: Adelaida, Primary Doctor        Subjective:     Principal Problem:GI bleed        HPI:  Blu Hart is a 39 y.o. male with a PMHx chronic back pain, nicotine dependence, and polysubstance abuse who presents to the ED with complaints of abdominal pain, rectal bleeding, and hematemesis x2 days. The patient reports he initially had diarrhea and lower abdominal pain 3 days ago which he thought was due to having ice cream because he is lactose intolerant. He states his symptoms worsened the next day and he began having multiple episodes of bloody diarrhea, noting both bright red blood and darker maroon stools. He states later that night he had an episode of emesis that he describes as dark, coffee-ground and reports an additional episode of coffee-ground emesis a few hours prior to coming to the ED. He vomited in the ED as well but did not note any blood. He reports the abdominal pain is mostly lower and is cramping with intermittent stabbing pain. He also endorses associated nausea, diaphoresis, chills, and lightheadedness. He denies fever, CP, SOB, cough, syncope, or dysuria. He denies EtOH use but reports taking 800mg of ibuprofen 4-5x/daily for the past 2 weeks. He also endorses IVDU and typically uses heroin and fentanyl multiple times per day and last used this morning.    In the ED, patient initially hypertensive and tachycardic which improved with pain control otherwise vitals stable, afebrile. CBC with stable anemia. Lipase 7. CMP unremarkable. Type & screen obtained. CTA abd/pelvis with findings suggestive of a nonspecific colitis involving the descending and rectosigmoid colon. No brisk active GI bleed. Hepatosplenomegaly. Age advanced  degenerative changes in both hips. The patient received 4mg IV morphine, zofran 8mg x1, and protonix 80mg.    Overview/Hospital Course:  Patient went with GI for EGD without findings. Colonoscopy showed area of ischemia and concern for infection in the area due to bleeding and mucosal breakdown. Plan to start several days of IV Zosyn, then transition to PO antibiotics on discharge per GI recommendations.   After discussion with patient and addiction team, plan to start suboxone but patient refused, saying it didn't work in the past. Addiction team to work with alternative plans    Interval History: feeling a little better today, still with a lot of stomach pains and upset stomach. But able to tolerate PO intake. Having some left arm pain at prior IV site.     Review of Systems   Constitutional:  Positive for fatigue. Negative for appetite change and fever.   Respiratory:  Negative for cough and shortness of breath.    Cardiovascular:  Negative for chest pain.   Gastrointestinal:  Positive for abdominal pain and blood in stool. Negative for nausea and vomiting.     Objective:     Vital Signs (Most Recent):  Temp: 98.1 °F (36.7 °C) (10/17/24 0724)  Pulse: 91 (10/17/24 0724)  Resp: 16 (10/17/24 0724)  BP: 117/72 (10/17/24 0724)  SpO2: (!) 94 % (10/17/24 0724) Vital Signs (24h Range):  Temp:  [98.1 °F (36.7 °C)-98.7 °F (37.1 °C)] 98.1 °F (36.7 °C)  Pulse:  [77-91] 91  Resp:  [16-19] 16  SpO2:  [94 %-99 %] 94 %  BP: (111-128)/(64-72) 117/72     Weight: 117.9 kg (260 lb)  Body mass index is 36.26 kg/m².  No intake or output data in the 24 hours ending 10/17/24 1055      Physical Exam  Constitutional:       Appearance: Normal appearance.   Cardiovascular:      Rate and Rhythm: Normal rate.      Pulses: Normal pulses.   Pulmonary:      Effort: Pulmonary effort is normal.      Breath sounds: Normal breath sounds.   Abdominal:      General: Abdomen is flat. Bowel sounds are normal. There is no distension.   Skin:     General:  Skin is warm and dry.      Comments: RUE with site of warmth/edema with pain around prior IV site   Neurological:      Mental Status: He is alert.             Significant Labs: All pertinent labs within the past 24 hours have been reviewed.    Significant Imaging: I have reviewed all pertinent imaging results/findings within the past 24 hours.    Assessment/Plan:      * GI bleed  Patient's hemorrhage is due to gastrointestinal bleed, patient does not have a propensity for bleeding.. Patients most recent Hgb, Hct, platelets, and INR are listed below.  Recent Labs     10/15/24  0430 10/16/24  0335 10/17/24  0223   HGB 11.6* 10.8* 12.8*   HCT 35.8* 33.0* 39.3*    225 331     -CTA abd/pelvis with findings suggestive of a nonspecific colitis involving the descending and rectosigmoid colon. No brisk active GI bleed. Hepatosplenomegaly. Age advanced degenerative changes in both hips.    Plan  - Will trend hemoglobin/hematocrit qd  - Will monitor and correct any coagulation defects  - Will transfuse if Hgb is <7g/dl (<8g/dl in cases of active ACS) or if patient has rapid bleeding leading to hemodynamic instability  - Started on GIB pathway.  - Use IV anti-emetics as needed.   - EGD on 10/14 without significant findings  - colonoscopy done 10/15, showed significant ischemic changes, concern for infection as well. Plan for several days of IV Zosyn then transition to oral antibiotics to completed therapy. Plan to transition to Augmentin on d/c if patient is stable and complete 10 days of abx total     Anemia  Anemia is likely due to acute blood loss which was from GIB vs colitis . Most recent hemoglobin and hematocrit are listed below.  Recent Labs     10/15/24  0430 10/16/24  0335 10/17/24  0223   HGB 11.6* 10.8* 12.8*   HCT 35.8* 33.0* 39.3*     Plan  - Monitor serial CBC: qd  - Transfuse PRBC if patient becomes hemodynamically unstable, symptomatic or H/H drops below 7/21.  - Patient has not received any PRBC  transfusions to date  - Patient's anemia is currently stable, with slight down trend, continue to monitor on daily H/H    Superficial thrombophlebitis of left upper extremity  - warmth, swelling and tenderness at site of prior IV. Causing discomfort and swelling at lower IV site  - rotate IV site to other arm  - warm compression at the site and elevation as tolerated      BMI 36.0-36.9,adult  Body mass index is 36.26 kg/m². Obesity complicates all aspects of disease management from diagnostic modalities to treatment.     Cigarette nicotine dependence without complication  Dangers of cigarette smoking were reviewed with patient in detail. Patient was Counseled for 3-10 minutes. Nicotine replacement options were discussed. Nicotine replacement was discussed- prescribed    Chronic back pain  -Chronic issue.  -PRN tylenol, lidocaine, and prn zanaflex.    Polysubstance abuse  Pt endorses IVDU, states he typically uses heroin and fentanyl multiple times per day.   -UDS pending.  -PRNs for opiate withdrawal.  -COWS score q4.  -Encouraged cessation from illicit drugs.  -Addiction psychiatry consulted, appreciate assistance.  - initial plan to start suboxone, patient refuse medication and wanted to discuss with addiction team about alternatives saying that suboxone had not worked for him in the past.       VTE Risk Mitigation (From admission, onward)           Ordered     IP VTE HIGH RISK PATIENT  Once         10/14/24 0121     Place sequential compression device  Until discontinued         10/14/24 0121                    Discharge Planning   CHIP: 10/18/2024     Code Status: Full Code   Is the patient medically ready for discharge?:     Reason for patient still in hospital (select all that apply): Patient trending condition and Treatment  Discharge Plan A: Home with family                  Ravin Shanks DO  Department of Hospital Medicine   [facility] - Internal Medicine Telemetry

## 2024-10-17 NOTE — PLAN OF CARE
Problem: Adult Inpatient Plan of Care  Goal: Plan of Care Review  Outcome: Progressing  Goal: Patient-Specific Goal (Individualized)  Outcome: Progressing  Goal: Absence of Hospital-Acquired Illness or Injury  Outcome: Progressing  Goal: Optimal Comfort and Wellbeing  Outcome: Progressing  Goal: Readiness for Transition of Care  Outcome: Progressing     Problem: Gastrointestinal Bleeding  Goal: Optimal Coping with Acute Illness  Outcome: Progressing  Goal: Hemostasis  Outcome: Progressing   Pt progressing toward goals. No distress noted. No falls or injuries during shift. Pt bed in lowest position. Side rails x2. Call bell and personal belongs within reach. Safety precautions maintained.

## 2024-10-17 NOTE — PLAN OF CARE
Problem: Adult Inpatient Plan of Care  Goal: Plan of Care Review  Outcome: Progressing  Goal: Patient-Specific Goal (Individualized)  Outcome: Progressing  Goal: Absence of Hospital-Acquired Illness or Injury  Outcome: Progressing  Goal: Optimal Comfort and Wellbeing  Outcome: Progressing  Goal: Readiness for Transition of Care  Outcome: Progressing     Problem: Gastrointestinal Bleeding  Goal: Optimal Coping with Acute Illness  Outcome: Progressing

## 2024-10-17 NOTE — ASSESSMENT & PLAN NOTE
- warmth, swelling and tenderness at site of prior IV. Causing discomfort and swelling at lower IV site  - rotate IV site to other arm  - warm compression at the site and elevation as tolerated

## 2024-10-17 NOTE — ASSESSMENT & PLAN NOTE
Pt endorses IVDU, states he typically uses heroin and fentanyl multiple times per day.   -UDS pending.  -PRNs for opiate withdrawal.  -COWS score q4.  -Encouraged cessation from illicit drugs.  -Addiction psychiatry consulted, appreciate assistance.  - initial plan to start suboxone, patient refuse medication and wanted to discuss with addiction team about alternatives saying that suboxone had not worked for him in the past.

## 2024-10-17 NOTE — ASSESSMENT & PLAN NOTE
Patient's hemorrhage is due to gastrointestinal bleed, patient does not have a propensity for bleeding.. Patients most recent Hgb, Hct, platelets, and INR are listed below.  Recent Labs     10/15/24  0430 10/16/24  0335 10/17/24  0223   HGB 11.6* 10.8* 12.8*   HCT 35.8* 33.0* 39.3*    225 331     -CTA abd/pelvis with findings suggestive of a nonspecific colitis involving the descending and rectosigmoid colon. No brisk active GI bleed. Hepatosplenomegaly. Age advanced degenerative changes in both hips.    Plan  - Will trend hemoglobin/hematocrit qd  - Will monitor and correct any coagulation defects  - Will transfuse if Hgb is <7g/dl (<8g/dl in cases of active ACS) or if patient has rapid bleeding leading to hemodynamic instability  - Started on GIB pathway.  - Use IV anti-emetics as needed.   - EGD on 10/14 without significant findings  - colonoscopy done 10/15, showed significant ischemic changes, concern for infection as well. Plan for several days of IV Zosyn then transition to oral antibiotics to completed therapy. Plan to transition to Augmentin on d/c if patient is stable and complete 10 days of abx total

## 2024-10-18 VITALS
RESPIRATION RATE: 17 BRPM | TEMPERATURE: 98 F | HEART RATE: 93 BPM | HEIGHT: 71 IN | BODY MASS INDEX: 36.4 KG/M2 | SYSTOLIC BLOOD PRESSURE: 126 MMHG | WEIGHT: 260 LBS | DIASTOLIC BLOOD PRESSURE: 78 MMHG | OXYGEN SATURATION: 98 %

## 2024-10-18 PROBLEM — F19.90 EXCESSIVE USE OF NONSTEROIDAL ANTI-INFLAMMATORY DRUG (NSAID): Status: RESOLVED | Noted: 2024-10-14 | Resolved: 2024-10-18

## 2024-10-18 PROBLEM — K92.2 GI BLEED: Status: RESOLVED | Noted: 2024-10-14 | Resolved: 2024-10-18

## 2024-10-18 PROBLEM — I80.8 SUPERFICIAL THROMBOPHLEBITIS OF LEFT UPPER EXTREMITY: Status: RESOLVED | Noted: 2024-10-17 | Resolved: 2024-10-18

## 2024-10-18 LAB
ANION GAP SERPL CALC-SCNC: 9 MMOL/L (ref 8–16)
BASOPHILS # BLD AUTO: 0.04 K/UL (ref 0–0.2)
BASOPHILS NFR BLD: 0.5 % (ref 0–1.9)
BUN SERPL-MCNC: 8 MG/DL (ref 6–20)
CALCIUM SERPL-MCNC: 9.5 MG/DL (ref 8.7–10.5)
CHLORIDE SERPL-SCNC: 104 MMOL/L (ref 95–110)
CO2 SERPL-SCNC: 21 MMOL/L (ref 23–29)
CREAT SERPL-MCNC: 0.8 MG/DL (ref 0.5–1.4)
DIFFERENTIAL METHOD BLD: ABNORMAL
EOSINOPHIL # BLD AUTO: 0.4 K/UL (ref 0–0.5)
EOSINOPHIL NFR BLD: 4.4 % (ref 0–8)
ERYTHROCYTE [DISTWIDTH] IN BLOOD BY AUTOMATED COUNT: 13.6 % (ref 11.5–14.5)
EST. GFR  (NO RACE VARIABLE): >60 ML/MIN/1.73 M^2
GLUCOSE SERPL-MCNC: 92 MG/DL (ref 70–110)
HCT VFR BLD AUTO: 36.8 % (ref 40–54)
HGB BLD-MCNC: 11.5 G/DL (ref 14–18)
IMM GRANULOCYTES # BLD AUTO: 0.03 K/UL (ref 0–0.04)
IMM GRANULOCYTES NFR BLD AUTO: 0.4 % (ref 0–0.5)
LYMPHOCYTES # BLD AUTO: 2.8 K/UL (ref 1–4.8)
LYMPHOCYTES NFR BLD: 34.8 % (ref 18–48)
MCH RBC QN AUTO: 27.8 PG (ref 27–31)
MCHC RBC AUTO-ENTMCNC: 31.3 G/DL (ref 32–36)
MCV RBC AUTO: 89 FL (ref 82–98)
MONOCYTES # BLD AUTO: 0.6 K/UL (ref 0.3–1)
MONOCYTES NFR BLD: 7.1 % (ref 4–15)
NEUTROPHILS # BLD AUTO: 4.2 K/UL (ref 1.8–7.7)
NEUTROPHILS NFR BLD: 52.8 % (ref 38–73)
NRBC BLD-RTO: 0 /100 WBC
PLATELET # BLD AUTO: 296 K/UL (ref 150–450)
PMV BLD AUTO: 9.9 FL (ref 9.2–12.9)
POTASSIUM SERPL-SCNC: 3.9 MMOL/L (ref 3.5–5.1)
RBC # BLD AUTO: 4.14 M/UL (ref 4.6–6.2)
SODIUM SERPL-SCNC: 134 MMOL/L (ref 136–145)
WBC # BLD AUTO: 7.99 K/UL (ref 3.9–12.7)

## 2024-10-18 PROCEDURE — 85025 COMPLETE CBC W/AUTO DIFF WBC: CPT

## 2024-10-18 PROCEDURE — 63600175 PHARM REV CODE 636 W HCPCS

## 2024-10-18 PROCEDURE — 63600175 PHARM REV CODE 636 W HCPCS: Performed by: NURSE PRACTITIONER

## 2024-10-18 PROCEDURE — 36415 COLL VENOUS BLD VENIPUNCTURE: CPT

## 2024-10-18 PROCEDURE — S4991 NICOTINE PATCH NONLEGEND: HCPCS | Performed by: NURSE PRACTITIONER

## 2024-10-18 PROCEDURE — 25000003 PHARM REV CODE 250: Performed by: NURSE PRACTITIONER

## 2024-10-18 PROCEDURE — 25000003 PHARM REV CODE 250: Performed by: STUDENT IN AN ORGANIZED HEALTH CARE EDUCATION/TRAINING PROGRAM

## 2024-10-18 PROCEDURE — 25000003 PHARM REV CODE 250

## 2024-10-18 PROCEDURE — 80048 BASIC METABOLIC PNL TOTAL CA: CPT

## 2024-10-18 RX ORDER — ACETAMINOPHEN 325 MG/1
650 TABLET ORAL EVERY 6 HOURS PRN
Start: 2024-10-18

## 2024-10-18 RX ORDER — LIDOCAINE 50 MG/G
1 PATCH TOPICAL DAILY
Qty: 30 PATCH | Refills: 0 | Status: SHIPPED | OUTPATIENT
Start: 2024-10-18 | End: 2024-10-18

## 2024-10-18 RX ORDER — ONDANSETRON 4 MG/1
4 TABLET, ORALLY DISINTEGRATING ORAL EVERY 6 HOURS PRN
Qty: 15 TABLET | Refills: 0 | Status: SHIPPED | OUTPATIENT
Start: 2024-10-18 | End: 2024-10-23

## 2024-10-18 RX ORDER — CLONIDINE HYDROCHLORIDE 0.1 MG/1
0.1 TABLET ORAL EVERY 4 HOURS PRN
Qty: 30 TABLET | Refills: 0 | Status: SHIPPED | OUTPATIENT
Start: 2024-10-18 | End: 2024-10-18

## 2024-10-18 RX ORDER — SIMETHICONE 80 MG
80 TABLET,CHEWABLE ORAL EVERY 6 HOURS PRN
Qty: 120 TABLET | Refills: 0 | Status: SHIPPED | OUTPATIENT
Start: 2024-10-18 | End: 2024-10-18

## 2024-10-18 RX ORDER — DICYCLOMINE HYDROCHLORIDE 10 MG/1
10 CAPSULE ORAL 4 TIMES DAILY PRN
Qty: 100 CAPSULE | Refills: 0 | Status: SHIPPED | OUTPATIENT
Start: 2024-10-18 | End: 2024-10-18

## 2024-10-18 RX ORDER — CLONIDINE HYDROCHLORIDE 0.1 MG/1
0.1 TABLET ORAL EVERY 4 HOURS PRN
Qty: 30 TABLET | Refills: 0 | Status: SHIPPED | OUTPATIENT
Start: 2024-10-18 | End: 2024-11-02

## 2024-10-18 RX ORDER — SIMETHICONE 80 MG
80 TABLET,CHEWABLE ORAL EVERY 6 HOURS PRN
Qty: 120 TABLET | Refills: 0 | Status: SHIPPED | OUTPATIENT
Start: 2024-10-18 | End: 2024-11-17

## 2024-10-18 RX ORDER — AMOXICILLIN AND CLAVULANATE POTASSIUM 875; 125 MG/1; MG/1
1 TABLET, FILM COATED ORAL EVERY 12 HOURS
Qty: 16 TABLET | Refills: 0 | Status: SHIPPED | OUTPATIENT
Start: 2024-10-18 | End: 2024-10-26

## 2024-10-18 RX ORDER — DICYCLOMINE HYDROCHLORIDE 10 MG/1
10 CAPSULE ORAL 4 TIMES DAILY PRN
Qty: 100 CAPSULE | Refills: 0 | Status: SHIPPED | OUTPATIENT
Start: 2024-10-18 | End: 2024-11-17

## 2024-10-18 RX ORDER — LIDOCAINE 50 MG/G
1 PATCH TOPICAL DAILY
Qty: 30 PATCH | Refills: 0 | Status: SHIPPED | OUTPATIENT
Start: 2024-10-18 | End: 2024-11-17

## 2024-10-18 RX ORDER — AMOXICILLIN AND CLAVULANATE POTASSIUM 875; 125 MG/1; MG/1
1 TABLET, FILM COATED ORAL EVERY 12 HOURS
Qty: 16 TABLET | Refills: 0 | Status: SHIPPED | OUTPATIENT
Start: 2024-10-18 | End: 2024-10-18

## 2024-10-18 RX ORDER — AMOXICILLIN AND CLAVULANATE POTASSIUM 875; 125 MG/1; MG/1
1 TABLET, FILM COATED ORAL EVERY 12 HOURS
Status: DISCONTINUED | OUTPATIENT
Start: 2024-10-18 | End: 2024-10-18 | Stop reason: HOSPADM

## 2024-10-18 RX ADMIN — SIMETHICONE 80 MG: 80 TABLET, CHEWABLE ORAL at 10:10

## 2024-10-18 RX ADMIN — SUCRALFATE 1 G: 1 SUSPENSION ORAL at 12:10

## 2024-10-18 RX ADMIN — DICYCLOMINE HYDROCHLORIDE 10 MG: 10 CAPSULE ORAL at 02:10

## 2024-10-18 RX ADMIN — LIDOCAINE 2 PATCH: 50 PATCH CUTANEOUS at 10:10

## 2024-10-18 RX ADMIN — Medication 1 PATCH: at 10:10

## 2024-10-18 RX ADMIN — PANTOPRAZOLE SODIUM 40 MG: 40 INJECTION, POWDER, LYOPHILIZED, FOR SOLUTION INTRAVENOUS at 10:10

## 2024-10-18 RX ADMIN — PIPERACILLIN SODIUM AND TAZOBACTAM SODIUM 4.5 G: 4; .5 INJECTION, POWDER, FOR SOLUTION INTRAVENOUS at 04:10

## 2024-10-18 RX ADMIN — SIMETHICONE 80 MG: 80 TABLET, CHEWABLE ORAL at 02:10

## 2024-10-18 RX ADMIN — AMOXICILLIN AND CLAVULANATE POTASSIUM 1 TABLET: 875; 125 TABLET, FILM COATED ORAL at 12:10

## 2024-10-18 RX ADMIN — DICYCLOMINE HYDROCHLORIDE 10 MG: 10 CAPSULE ORAL at 10:10

## 2024-10-18 NOTE — NURSING
Patient has discharge order. Dc'd IV..Patient verbalized understanding of dc instructions given. Female  and patient went to pharmacy to  patient's ABT medication.

## 2024-10-18 NOTE — NURSING
Nurse went to round on pt, pt not in room. Nurse wrote on board to call when back in room.   Pt came back at 2030  Nurse went to give meds at 2205 pt not in room.   Pt back in room at 2210    Pt left room at 0230, pt came back to room at 0330.

## 2024-10-18 NOTE — DISCHARGE INSTRUCTIONS
REFERRAL RECOMMENDATIONS FOR ALCOHOL USE DISORDER      12 STEP PROGRAMS (and similar):     Alcoholics Anonymous (local)  [x] [phone]  [x] www.[place].org for schedules for in-person and online meetings  [x] There are AA meetings throughout the day all over town  [x] AA costs nothing to attend; they pass a basket for donations but this is not required    Alcoholics Anonymous Online Intergroup (national)  [x] www.aa-intergroup.org  [x] Good resource for large, nation-wide meetings  [x] Can also attend smaller, local meetings in other cities  [x] Countless meetings all day and all night  [x] AA costs nothing to attend; they pass a basket for donations but this is not required    Flying Sober - 24/7 zoom meetings for women and coed - sign on anytime, anywhere!  https://DMC Consulting GroupsobManflu/55-9-dnqsuibq/    Online Intergroup of AA - 121 Open AA [place] Meeting - 24/7 zoom meetings  https://aa-intergroup.org/meetings/    LOOKING FOR AN ALTERNATIVE TO 12 STEP PROGRAMS - check out:  SMART Recovery: https://www.smartrecovery.org/about-us  [facility] Recovery: https://recoverydharma.org      DETOX UNITS (USUALLY 5-7 DAYS):     [facility] [place] [facility]: [address], [place]  [phone], call first to ensure bed availability    [facility]: [address] [place]  [phone], Option 1, call first to ensure bed availability    [place] [facility] Center: [facility] [address], [place]  [phone] (intake by appointment only)    [facility]: [address], [place]  [phone]      INTENSIVE OUTPATIENT PROGRAMS:     [facility] RECOVERY PROGRAM (formerly known as the ABU)  [x] [phone], Option 2  [x] [address] 4th Floor, [place] [address]  [x] https://www.ochsner.org/services/[facility]-recovery-program  [x] [facility] delivers comprehensive and collaborative treatment for alcohol and substance use disorders.  Excellent program for working professionals or  anyone else seeking recovery.  [x] Requires insurance approval prior to starting program, call number above for more information.  [x] Intensive Outpatient Rehabilitation Program - M-F 9am-3pm - daily groups with psychologists and social workers, sessions with MDs 3x per week   [x] Ambulatory detox and dual diagnosis available    [facility] Intensive Outpatient Program  [x] [phone]  [x] [address] [facility] (the clinic not on [facility])  [x] Call number above for more info and to check insurance requirements    [facility]  [address], [place] [address] [phone]    [facility]:  [address] [facility] [address], [address] [place], [place] [address]?, [phone]  [address]?, [place], [place] [address]?, [phone]    RESIDENTIAL REHABS (USUALLY 28 DAYS):     [facility]: [address], [phone]    [facility]: [address] , [facility]  [phone] (intake by appointment only)    [facility] (men only) [address] [facility], [phone]    [facility] (Female only) [address] [facility], [phone]    [facility]: [address], [facility], men's program [phone], women's program [phone]    [facility]: [address], [facility], [phone]    [facility]: [address] [place], [place], [phone]    [facility]: Men only, [phone], [address] [place] [facility]: [address], [place], [place], [phone]    [facility]: [facility] [place], [place],  [phone]  New Location: [address] [place] [address] 100, [place] [address], [phone]    [facility]:   ?[address]?[place], [place] [address]?[phone]    [place]: [address], [place] [address], [phone]    [place]: [place], [phone]     [facility]: [place], [place], [phone]    [place]: [place]  [address] [place], [phone]    [facility]: [place], [place], [phone]    [place]: [place], [place], [phone]    [facility]rie [place]: [address], [place] [address], [phone]    COMMUNITY ADDICTION CLINICS:     [facility]: [address] [facility], [address] [place], [phone] -or- [address] [place] [address]    [facility] [place]: [address] [facility], [place]  [address]     [facility]: Clinics [phone]; Crisis [phone]    [facility]: [address] [place] [address]    [facility]: [address] [facility] [address]    [facility]: [address] [place] [address],    [facility]: 2nd Floor [address] [place] [address]    [facility]: [address] Tim[address] [place] [address]    [facility], [address] [place] [address]    [facility]: [address] [place] [address], [place] [phone]  (serves youth 16-23 years old)    [facility]: [facility]/[place] [phone]    [facility]: [address] [facility], [place] [phone]    [facility]: [address], [place] [phone]    [facility]: [address] [facility], [address], [phone]     [facility]: [address] [facility], [phone], [phone]    RESOURCES IN OTHER [facility] [place]:     [facility]: [address] [place], [phone], [phone]    [facility]: [address] [facility], [address], [phone]    [facility], [address] [place] [address], [phone]    [facility]: [address], [place], [phone]    [facility]  "[facility] Health, [address] [facility], [phone] ([facility])    [facility], [address] [facility], [phone] ([facility])    [facility], [address], [place], [phone] ([place], [place], [facility])    [facility], [address], [place], [phone] ([facility])    [facility] Hotline [phone], [phone]    [facility], [address] [facility], [facility], [address] [facility], [address], [facility], [address] [facility], [facility] Behavioral [facility], [address] [facility] [address]., [place] [facility], [address] [facility] Human Services, [address], [address] [phone]    [facility], [address] [facility] [phone]    [facility], [address] [facility], [phone]    [facility] for Addiction Recovery, [address] [place] [address], [phone]      Belgian SPEAKING (en español):     Información de la reunión de Alcohólicos Anónimos  [facility], 10:00 am  Habla español  Esta reunión está abierta y cualquiera puede asistir.    Persian speaking Alcoholics anonymous meetings:  El "Kemar [place] AA Skype" es un kemar on line de Alcohólicos Anónimos en español. El kemar es saba, gratuito y virtual a través de Skype Audio. El kemar funciona mediante clau llamada grupal de voz, por lo que no se utiliza la videollamada, ni se pueden rustam las imágenes o rostros de los participantes. Hace zack años y medio abrimos el primer Kemar de AA por [place] en [place], feliz actualmente asisten personas desde [place], [place], [place], [place], [place], [place], [place], [place], [place], [place], " [place] y [place], entre otros.    El mohsen es muy útil para los alcohólicos que residen en lugares donde no se celebran reuniones de AA, o residen en lugares donde las reuniones de AA son un número limitado de días a la semana, o para aquellos compañeros que se hayan de viaje o que, por cualquier motivo, se hayan convalecientes y no pueden desplazarse. Todos los días nos reuniones a las 21:00 (hora española)    Podéis obtener más información sobre el mohsen y brooks sesiones en la págRightNow Technologies web [email or web address].DreamsCloud.tl/      MENTAL HEALTH/ADDICTIVE DISORDERS:     AA ([phone]), NA ([phone])   National Suicide Prevention Lifeline- Call 1-836.724.1318 Available 24 hours [facility] [phone]; Crisis Line 561-0021 - Call for options A-F:  Intensive Outpatient Treatment/ Day programs   [facility], please contact   [facility], please contact [phone] or [phone] to speak with an admissions counselor.  [facility] (Methadone Maintenance)   [address] [place] [address], [phone]  [facility] [place] [address] [phone]  [facility], [address] [place] [address], [phone]  [facility] [phone]  [facility] [place] please contact [phone]  [facility], [facility] [facility], 4th floor BAMBI Mckenna [address] Phone: [phone]   [place]  [place] Office: [address] [address], [phone]  [place] Office: [address] [facility] [address], [place]ck[place] [address], [phone]  [place] Office: [address] [facility] [place] [address] [phone]    Outpatient Substance Abuse Treatment   Behavioral Health Group (Methadone Maintenance)   [facility] [place] [address], [phone]  [address] [place] [address] [phone]  [facility], [address] [place] [address], [phone]  [address] Office: [address] [place] [address], [phone]  [place] Office: [address] [facility], [address], [place] [address], [phone]  [place] Office: [address] [place] [address] [phone]  [facility] [place] [facility], [address] [place] [address] [phone]   [facility] Recovery, [address] [facility], [address], [phone]  [facility] for Addiction Recover, [address] [place], [place] [phone]    Residential Substance Abuse Treatment   [facility] [address] [facility], [phone] or [phone]  [facility], [address] [facility], [phone]  [facility] (men only) [address] [place] [address], [phone]  [facility] (women only) [address] [place] [address] [phone]  [facility], [address] [place] [address] [phone]  formerly [facility] (women only), intakes at [address], [phone]  [facility] (7-day program, $100, [address] [place], [phone], [phone], [phone])  [facility] (Men only, [phone]), [address], Dave (Vets*/Non-Vets)  [facility] (Men only, $400/month program fee) [address] [facility] Ren[place] Christopher, [phone]  [facility] (Women over age 39 only), [address] [facility], [phone]    Out of Area:    [place], [address] [place] [address] 36, [place], [place] [phone])  [facility] Recovery Program (men only), [address] [facility] [place] [place] [address], [phone]  [facility], [address] [place], [place] [phone])  [address] [place], [place] ([phone])  [facility], [address] [place] [address], [phone]  [facility] (Women only, has to have mental illness, can be homeless or substance abuser), [phone]      [place] RESOURCES:     [place] Mobile [facility] Crisis Response  Team:    Region 12 ([place], [place], [place], and [facility]) ([facility])  [phone]      Outpatient Mental Health & Addiction Clinic Resources for both [facility] and [facility]:    [facility] Mental Healthcare Resources  Website: www.[facility] Hospital[facility].org  Main Number: 084-264-1149    [facility] ([facility] Area)  [address] ([address] [facility]) [place] [address]  [phone]    [facility] ([facility])  [address] ([address] [facility]) [place] [address]  [phone]    [facility]  [address] ([address]) [place] [address]  [phone]    [facility]  [address] ([address] [facility] [place]) [place] [address]  [phone]      Addiction Treatment Resources for both [facility] and [facility]:    [facility] (Detox, Residential, PHP, IOP, and Aftercare Programs)  [address], [place] [address] [phone]    [facility] (Residential, IOP, Transitional Living, and Aftercare Programs)  [address] [facility] [place] [address]  [phone]    [facility] (Inpatient, Residential, Detox, IOP, Outpatient, and Aftercare Programs)  [address] [place] [address] [phone] or toll free at [phone]      Outpatient Mental Health Psychotherapy Resources for both [facility]:    Malgorzata Blair, SAJIW  [address]  [place] [address]  [phone]  Specialties: Depression, Anxiety, and Life Transitions    Kiya Woodward, PhD  [address]  [place] [address]  [phone]  Specialties: Testing and Evaluation, Education and Learning Disabilities, and ADHD    Renetta Madrid, SAJIW [facility] [address] [place] [address]  [phone]  Specialties:  Obsessive-Compulsive (OCD), Depression, and Relationship Issues    Lily Mohr LPC [address] [place] [facility] [place] [address]  [phone]  Specialties: Trauma & PTSD, Mood Disorders, and Anxiety    Lily Burnett, PhD, [facility] Counsel[facility] [address], [place] [address] [phone]  Specialties: Family Conflict, Child, and Relationship Issues    Halima Leblanc LPC Counseling [place], [place] [address] [phone]  Specialties: Anxiety, Depression, and Anger Management        IN CASE OF SUICIDAL THINKING, call the National Suicide Hotline Number: 988    988 Suicide & Crisis Lifeline: 988 , 7-425-076-TALK (5524)  Provides 24/7, free and confidential support for people in distress, prevention and crisis resources for you or your loved ones, and best practices for professionals.    Call, text or chat.  https://YOGITECH8American Family Pharmacyline.org              Please continue taking your antibiotic as prescribed.   Please follow up with your PCP, this has been arranged for you   Information on addiction medicine clinic given to you in your instructions

## 2024-10-18 NOTE — PLAN OF CARE
Franky Aguilar - Internal Medicine Telemetry  Discharge Reassessment    Primary Care Provider: No, Primary Doctor    Expected Discharge Date: 10/18/2024    Reassessment (most recent)       Discharge Reassessment - 10/18/24 1444          Discharge Reassessment    Assessment Type Discharge Planning Reassessment (P)      Did the patient's condition or plan change since previous assessment? No (P)      Discharge Plan discussed with: Patient;Spouse/sig other (P)      Name(s) and Number(s) Romy Almeida (Significant other)  [phone] (P)      Communicated CHIP with patient/caregiver Yes (P)      Discharge Plan A Home with family (P)      Discharge Plan B Home (P)      DME Needed Upon Discharge  none (P)      Transition of Care Barriers Substance Abuse (P)         Post-Acute Status    Post-Acute Authorization Other (P)      Coverage CIGNA - CIGNA EPO - (P)      Other Status Awaiting f/u Appts (P)                  Discharge Plan A and Plan B have been determined by review of patient's clinical status, future medical and therapeutic needs, and coverage/benefits for post-acute care in coordination with multidisciplinary team members.                     Agustín Beck, MSW, LMSW  [facility]  Case Management  Ext. [phone]

## 2024-10-18 NOTE — DISCHARGE SUMMARY
[facility] - Internal Medicine [facility] Medicine  Discharge Summary      Patient Name: Blu Hart  MRN: [id number]  NEFTALI: [id number]  Patient Class: IP- Inpatient  Admission Date: 10/13/2024  Hospital Length of Stay: 2 days  Discharge Date and Time:  10/18/2024 12:27 PM  Attending Physician: Ayanna Rebollar MD   Discharging Provider: Ayanna Rebollar MD  Primary Care Provider: Adelaida Primary Doctor  [facility] Medicine Team: [facility] HOSP MED Q Ayanna Rebollar MD  Primary Care Team: [facility] MED Q    HPI:   Blu Hart is a 39 y.o. male with a PMHx chronic back pain, nicotine dependence, and polysubstance abuse who presents to the ED with complaints of abdominal pain, rectal bleeding, and hematemesis x2 days. The patient reports he initially had diarrhea and lower abdominal pain 3 days ago which he thought was due to having ice cream because he is lactose intolerant. He states his symptoms worsened the next day and he began having multiple episodes of bloody diarrhea, noting both bright red blood and darker maroon stools. He states later that night he had an episode of emesis that he describes as dark, coffee-ground and reports an additional episode of coffee-ground emesis a few hours prior to coming to the ED. He vomited in the ED as well but did not note any blood. He reports the abdominal pain is mostly lower and is cramping with intermittent stabbing pain. He also endorses associated nausea, diaphoresis, chills, and lightheadedness. He denies fever, CP, SOB, cough, syncope, or dysuria. He denies EtOH use but reports taking 800mg of ibuprofen 4-5x/daily for the past 2 weeks. He also endorses IVDU and typically uses heroin and fentanyl multiple times per day and last used this morning.    In the ED, patient initially hypertensive and tachycardic which improved with pain control otherwise vitals stable, afebrile. CBC with stable anemia. Lipase 7. CMP unremarkable. Type & screen obtained. CTA  abd/pelvis with findings suggestive of a nonspecific colitis involving the descending and rectosigmoid colon. No brisk active GI bleed. Hepatosplenomegaly. Age advanced degenerative changes in both hips. The patient received 4mg IV morphine, zofran 8mg x1, and protonix 80mg.    Procedure(s) (LRB):  COLONOSCOPY (N/A)      Hospital Course:   Patient went with GI for EGD without findings. Colonoscopy showed area of ischemia and concern for infection in the area due to bleeding and mucosal breakdown. Pathology negative for malignancy/neoplasia. He was started on IV Zosyn and trasnitioned to PO Augmentin per  GI recommendations.  After discussion with patient and addiction team, plan to start suboxone but patient refused, saying it didn't work in the past. Discussions were had about possible initiation of Subtutex, but decision to hold off at this time. Plans made with Case management for patient to be provided with PCP and arrangements made for Addiction Medicine outpatient follow up. He was given medications for symptomatic withdrawal with plans for follow up by PCP.     Pt deemed appropriate for discharge; seen and examined prior to departure. Plan discussed with pt, who was agreeable and amenable; medications were discussed and reviewed, outpatient follow-up scheduled, ER precautions were given, all questions were answered to the pt's satisfaction, and he was subsequently discharged.       Goals of Care Treatment Preferences:  Code Status: Full Code      SDOH Screening:  The patient was screened for utility difficulties, food insecurity, transport difficulties, housing insecurity, and interpersonal safety and there were no concerns identified this admission.     Consults:   Consults (From admission, onward)          Status Ordering Provider     [facility]  Once        Provider:  (Not yet assigned)    Completed FIDENCIO SHEA     Inpatient consult to Psychiatry  Once        Provider:   (Not yet assigned)    Completed ADA TAMAYO     Inpatient consult to Gastroenterology  Once        Provider:  (Not yet assigned)    Completed ADA TAMAYO            No new Assessment & Plan notes have been filed under this hospital service since the last note was generated.  Service: Hospital Medicine    Final Active Diagnoses:    Diagnosis Date Noted POA    Chronic back pain [M54.9, G89.29] 10/14/2024 Yes    Cigarette nicotine dependence without complication [F17.210] 10/14/2024 Yes    Polysubstance abuse [F19.10] 10/14/2024 Yes    Anemia [D64.9] 10/14/2024 Yes    BMI 36.0-36.9,adult [Z68.36] 10/14/2024 Not Applicable      Problems Resolved During this Admission:    Diagnosis Date Noted Date Resolved POA    PRINCIPAL PROBLEM:  GI bleed [K92.2] 10/14/2024 10/18/2024 Yes    Superficial thrombophlebitis of left upper extremity [I80.8] 10/17/2024 10/18/2024 No    Excessive use of nonsteroidal anti-inflammatory drug (NSAID) [F19.90] 10/14/2024 10/18/2024 Yes       Discharged Condition: good    Disposition: Home     Follow Up:   Follow-up Information       No, Primary Doctor Follow up.    Why: CHW called primary care to schedule pcp margarito/uTai sent message to clinic to call the patient to schedule appt.                         Patient Instructions:      Ambulatory referral/consult to Addiction Psychiatry   Standing Status: Future   Referral Priority: Routine Referral Type: Psychiatric   Referral Reason: Specialty Services Required   Requested Specialty: Addiction Medicine   Number of Visits Requested: 1       Significant Diagnostic Studies: N/A    Pending Diagnostic Studies:       None           Medications:  Reconciled Home Medications:      Medication List        START taking these medications      acetaminophen 325 MG tablet  Commonly known as: TYLENOL  Take 2 tablets (650 mg total) by mouth every 6 (six) hours as needed.     amoxicillin-clavulanate 875-125mg 875-125 mg per tablet  Commonly known as:  AUGMENTIN  Take 1 tablet by mouth every 12 (twelve) hours. for 8 days     cloNIDine 0.1 MG tablet  Commonly known as: CATAPRES  Take 1 tablet (0.1 mg total) by mouth every 4 (four) hours as needed (for opoid withdrawal).     dicyclomine 10 MG capsule  Commonly known as: BENTYL  Take 1 capsule (10 mg total) by mouth 4 (four) times daily as needed (abd cramping/pain).     LIDOcaine 5 %  Commonly known as: LIDODERM  Place 1 patch onto the skin once daily. Remove & Discard patch within 12 hours or as directed by MD     simethicone 80 MG chewable tablet  Commonly known as: MYLICON  Chew and swallow 1 tablet (80 mg total) by mouth every 6 (six) hours as needed for Flatulence (for abdominal pain/flatuance).            CONTINUE taking these medications      ondansetron 4 MG Tbdl  Commonly known as: ZOFRAN-ODT  Take 1 tablet (4 mg total) by mouth every 6 (six) hours as needed (nausea).            STOP taking these medications      polyethylene glycol 17 gram/dose powder  Commonly known as: GLYCOLAX              Indwelling Lines/Drains at time of discharge:   Lines/Drains/Airways       None                   Time spent on the discharge of patient: 35 minutes         Ayanna Rebollar MD  Department of Hospital Medicine  [facility] - Internal Medicine Telemetry

## 2024-10-18 NOTE — PLAN OF CARE
Follow up with No, Primary Doctor  CHW called primary care to schedule pcp f/u, Tai sent message to clinic to call the patient to schedule appt.

## 2024-10-18 NOTE — PLAN OF CARE
CHW provided patient with resources:      Food Pantry by: [facility]    Food Pantry by: [facility]    Financial Assistance Funds by: [employer]    Affordable Housing by: [employer].    Financial Assistance by: [employer] ([employer])     Drug Addiction Hotline by: [employer]

## 2024-10-19 NOTE — PLAN OF CARE
[facility] - Internal Medicine Telemetry  Discharge Final Note    Primary Care Provider: No, Primary Doctor    Expected Discharge Date: 10/18/2024    Final Discharge Note (most recent)       Final Note - 10/19/24 0815          Final Note    Assessment Type Final Discharge Note     Anticipated Discharge Disposition Home or Self Care     What phone number can be called within the next 1-3 days to see how you are doing after discharge? [phone] (P)         Post-Acute Status    Post-Acute Authorization Other (P)      Coverage [employer] - C[employer] EPO - (P)      Other Status Awaiting f/u Appts (P)                      Important Message from Medicare             Contact Info       No, Primary Doctor   Relationship: PCP - General        Next Steps: Follow up    Instructions: ALYSIA called primary care to schedule pcp f/u, Tai sent message to clinic to call the patient to schedule appt.          Patient discharged home w/ family.                SCOUT Adorno, LMSW  [facility]  Case Management  Ext. [phone]

## 2024-12-09 ENCOUNTER — TELEPHONE (OUTPATIENT)
Dept: GASTROENTEROLOGY | Facility: CLINIC | Age: 39
End: 2024-12-09
Payer: COMMERCIAL

## 2024-12-09 NOTE — TELEPHONE ENCOUNTER
----- Message from Tez Velasquez MD sent at 12/6/2024  4:39 PM CST -----  Patient needs follow-up appointment in GI clinic with either Dr. Rodas or Sb for ischemic colitis. Patient does not have [employer] [facility] [employer] access. Please contact him for scheduling.

## 2025-03-14 ENCOUNTER — HOSPITAL ENCOUNTER (EMERGENCY)
Facility: OTHER | Age: 40
Discharge: HOME | End: 2025-03-14
Payer: COMMERCIAL

## 2025-03-14 VITALS
RESPIRATION RATE: 16 BRPM | SYSTOLIC BLOOD PRESSURE: 137 MMHG | DIASTOLIC BLOOD PRESSURE: 84 MMHG | OXYGEN SATURATION: 95 % | HEART RATE: 84 BPM | WEIGHT: 265 LBS | HEIGHT: 71 IN | BODY MASS INDEX: 37.1 KG/M2 | TEMPERATURE: 98 F

## 2025-03-14 DIAGNOSIS — F19.90 IV DRUG USER: ICD-10-CM

## 2025-03-14 DIAGNOSIS — T14.8XXA WOUND INFECTION: ICD-10-CM

## 2025-03-14 DIAGNOSIS — L08.9 WOUND INFECTION: ICD-10-CM

## 2025-03-14 DIAGNOSIS — L03.113 CELLULITIS OF RIGHT UPPER EXTREMITY: Primary | ICD-10-CM

## 2025-03-14 DIAGNOSIS — L03.114 CELLULITIS OF LEFT UPPER EXTREMITY: ICD-10-CM

## 2025-03-14 LAB
ALBUMIN SERPL BCP-MCNC: 3.8 G/DL (ref 3.5–5.2)
ALP SERPL-CCNC: 91 U/L (ref 40–150)
ALT SERPL W/O P-5'-P-CCNC: 9 U/L (ref 10–44)
ANION GAP SERPL CALC-SCNC: 9 MMOL/L (ref 8–16)
AST SERPL-CCNC: 12 U/L (ref 10–40)
BASOPHILS # BLD AUTO: 0.04 K/UL (ref 0–0.2)
BASOPHILS NFR BLD: 0.5 % (ref 0–1.9)
BILIRUB SERPL-MCNC: 0.3 MG/DL (ref 0.1–1)
BUN SERPL-MCNC: 13 MG/DL (ref 6–20)
CALCIUM SERPL-MCNC: 9.4 MG/DL (ref 8.7–10.5)
CHLORIDE SERPL-SCNC: 105 MMOL/L (ref 95–110)
CO2 SERPL-SCNC: 24 MMOL/L (ref 23–29)
CREAT SERPL-MCNC: 0.8 MG/DL (ref 0.5–1.4)
DIFFERENTIAL METHOD BLD: ABNORMAL
EOSINOPHIL # BLD AUTO: 0.2 K/UL (ref 0–0.5)
EOSINOPHIL NFR BLD: 1.9 % (ref 0–8)
ERYTHROCYTE [DISTWIDTH] IN BLOOD BY AUTOMATED COUNT: 13.2 % (ref 11.5–14.5)
EST. GFR  (NO RACE VARIABLE): >60 ML/MIN/1.73 M^2
GLUCOSE SERPL-MCNC: 101 MG/DL (ref 70–110)
HCT VFR BLD AUTO: 40.5 % (ref 40–54)
HGB BLD-MCNC: 13.1 G/DL (ref 14–18)
IMM GRANULOCYTES # BLD AUTO: 0.03 K/UL (ref 0–0.04)
IMM GRANULOCYTES NFR BLD AUTO: 0.4 % (ref 0–0.5)
LYMPHOCYTES # BLD AUTO: 2.2 K/UL (ref 1–4.8)
LYMPHOCYTES NFR BLD: 28 % (ref 18–48)
MCH RBC QN AUTO: 28.1 PG (ref 27–31)
MCHC RBC AUTO-ENTMCNC: 32.3 G/DL (ref 32–36)
MCV RBC AUTO: 87 FL (ref 82–98)
MONOCYTES # BLD AUTO: 0.6 K/UL (ref 0.3–1)
MONOCYTES NFR BLD: 7.1 % (ref 4–15)
NEUTROPHILS # BLD AUTO: 4.8 K/UL (ref 1.8–7.7)
NEUTROPHILS NFR BLD: 62.1 % (ref 38–73)
NRBC BLD-RTO: 0 /100 WBC
PLATELET # BLD AUTO: 270 K/UL (ref 150–450)
PMV BLD AUTO: 9.4 FL (ref 9.2–12.9)
POTASSIUM SERPL-SCNC: 4.3 MMOL/L (ref 3.5–5.1)
PROT SERPL-MCNC: 8.5 G/DL (ref 6–8.4)
RBC # BLD AUTO: 4.66 M/UL (ref 4.6–6.2)
SODIUM SERPL-SCNC: 138 MMOL/L (ref 136–145)
WBC # BLD AUTO: 7.78 K/UL (ref 3.9–12.7)

## 2025-03-14 PROCEDURE — 85025 COMPLETE CBC W/AUTO DIFF WBC: CPT | Performed by: NURSE PRACTITIONER

## 2025-03-14 PROCEDURE — 25000003 PHARM REV CODE 250

## 2025-03-14 PROCEDURE — 80053 COMPREHEN METABOLIC PANEL: CPT | Performed by: NURSE PRACTITIONER

## 2025-03-14 PROCEDURE — 99284 EMERGENCY DEPT VISIT MOD MDM: CPT | Mod: 25

## 2025-03-14 RX ORDER — SULFAMETHOXAZOLE AND TRIMETHOPRIM 800; 160 MG/1; MG/1
1 TABLET ORAL
Status: COMPLETED | OUTPATIENT
Start: 2025-03-14 | End: 2025-03-14

## 2025-03-14 RX ORDER — SULFAMETHOXAZOLE AND TRIMETHOPRIM 800; 160 MG/1; MG/1
1 TABLET ORAL 2 TIMES DAILY
Qty: 14 TABLET | Refills: 0 | Status: SHIPPED | OUTPATIENT
Start: 2025-03-14 | End: 2025-03-21

## 2025-03-14 RX ORDER — IBUPROFEN 400 MG/1
800 TABLET ORAL
Status: COMPLETED | OUTPATIENT
Start: 2025-03-14 | End: 2025-03-14

## 2025-03-14 RX ADMIN — IBUPROFEN 800 MG: 400 TABLET ORAL at 08:03

## 2025-03-14 RX ADMIN — SULFAMETHOXAZOLE AND TRIMETHOPRIM 1 TABLET: 800; 160 TABLET ORAL at 08:03

## 2025-03-14 NOTE — ED TRIAGE NOTES
Pt reports to ED with abscesses to bilateral hands and arms secondary to IV drug use. States he feels like he has patricia running a fever at home. A-febrile on arrival. No drainage noted from areas. Vital signs WNL.

## 2025-03-14 NOTE — FIRST PROVIDER EVALUATION
Emergency Department TeleTriage Encounter Note      CHIEF COMPLAINT    Chief Complaint   Patient presents with    Abscess     Reports abscess to L hand s/p injecting drugs 1 wk ago. Also c/o fatigue and subjective fevers.       VITAL SIGNS   Initial Vitals [03/14/25 1713]   BP Pulse Resp Temp SpO2   137/84 84 16 98 °F (36.7 °C) 95 %      MAP       --            ALLERGIES    Review of patient's allergies indicates:  No Known Allergies    PROVIDER TRIAGE NOTE  This is a teletriage evaluation of a 39 y.o. male presenting to the ED with c/o bilateral hand wounds with swelling, heroin injection site. Reports fever and fatigue as well. Limited physical exam via telehealth: The patient is awake, alert, answering questions appropriately and is not in respiratory distress. Initial orders will be placed and care will be transferred to an alternate provider when patient is roomed for a full evaluation. Any additional orders and the final disposition will be determined by that provider.         ORDERS  Labs Reviewed - No data to display    ED Orders (720h ago, onward)      Start Ordered     Status Ordering Provider    03/14/25 1734 03/14/25 1733  X-Ray Hand Complete Bilateral  1 time imaging         Ordered ANTONIO HOANG    03/14/25 1733 03/14/25 1733  Saline lock IV  Once         Ordered ANTONIO HOANG    03/14/25 1733 03/14/25 1733  CBC auto differential  STAT         Ordered ANTONIO HOANG    03/14/25 1733 03/14/25 1733  Comprehensive metabolic panel  STAT         Ordered ANTONIO HOANG              Virtual Visit Note: The provider triage portion of this emergency department evaluation and documentation was performed via [employer], a HIPAA-compliant telemedicine application, in concert with a tele-presenter in the room. A face to face patient evaluation with one of my colleagues will occur once the patient is placed in an emergency department room.      DISCLAIMER: This note was prepared with M*Modal voice  recognition transcription software. Garbled syntax, mangled pronouns, and other bizarre constructions may be attributed to that software system.

## 2025-03-15 NOTE — ED PROVIDER NOTES
Encounter Date: 3/14/2025       History     Chief Complaint   Patient presents with    Abscess     Reports abscess to L hand s/p injecting drugs 1 wk ago. Also c/o fatigue and subjective fevers.     39-year-old male with IV drug use presents to the emergency department for pain and redness to his bilateral hands and left upper extremity where he injects his drugs.  Symptoms has been worsening for about a week.  Did not take temperature at home, reporting subjective fevers and chills.  Also reports some spontaneous drainage from the sites.  Patient states he is from [place] and also asking for refills of his home medications.    The history is provided by the patient.     Review of patient's allergies indicates:  No Known Allergies  Past Medical History:   Diagnosis Date    Chronic back pain     Nicotine dependence     Polysubstance abuse      Past Surgical History:   Procedure Laterality Date    COLONOSCOPY N/A 10/15/2024    Procedure: COLONOSCOPY;  Surgeon: Tez Velasquez MD;  Location: [facility] ([address] [place] [address]);  Service: Endoscopy;  Laterality: N/A;    ESOPHAGOGASTRODUODENOSCOPY N/A 10/14/2024    Procedure: EGD (ESOPHAGOGASTRODUODENOSCOPY);  Surgeon: Tez Velasquez MD;  Location: [address]);  Service: Endoscopy;  Laterality: N/A;     No family history on file.  Social History[1]  Review of Systems    Physical Exam     Initial Vitals [03/14/25 1713]   BP Pulse Resp Temp SpO2   137/84 84 16 98 °F (36.7 °C) 95 %      MAP       --         Physical Exam    Nursing note and vitals reviewed.  Constitutional: He is not diaphoretic. No distress.   HENT:   Head: Normocephalic and atraumatic.   Neck: Neck supple.   Normal range of motion.  Cardiovascular:  Normal rate and regular rhythm.           Pulmonary/Chest: Breath sounds normal. No respiratory distress. He has no wheezes. He has no rhonchi. He has no rales.   Abdominal: Abdomen is soft. He exhibits no distension. There is no abdominal tenderness. There  is no rebound and no guarding.   Musculoskeletal:         General: No edema.      Cervical back: Normal range of motion and neck supple.     Neurological: He is alert.   Skin: Skin is warm and dry.   Track marks to bilateral hands with surrounding erythema, no active drainage or fluctuance.   Psychiatric: He has a normal mood and affect. Thought content normal.         ED Course   Procedures  Labs Reviewed   CBC W/ AUTO DIFFERENTIAL - Abnormal       Result Value    WBC 7.78      RBC 4.66      Hemoglobin 13.1 (*)     Hematocrit 40.5      MCV 87      MCH 28.1      MCHC 32.3      RDW 13.2      Platelets 270      MPV 9.4      Immature Granulocytes 0.4      Gran # (ANC) 4.8      Immature Grans (Abs) 0.03      Lymph # 2.2      Mono # 0.6      Eos # 0.2      Baso # 0.04      nRBC 0      Gran % 62.1      Lymph % 28.0      Mono % 7.1      Eosinophil % 1.9      Basophil % 0.5      Differential Method Automated     COMPREHENSIVE METABOLIC PANEL - Abnormal    Sodium 138      Potassium 4.3      Chloride 105      CO2 24      Glucose 101      BUN 13      Creatinine 0.8      Calcium 9.4      Total Protein 8.5 (*)     Albumin 3.8      Total Bilirubin 0.3      Alkaline Phosphatase 91      AST 12      ALT 9 (*)     eGFR >60      Anion Gap 9            Imaging Results              X-Ray Hand Complete Bilateral (In process)                      Medications   sulfamethoxazole-trimethoprim 800-160mg per tablet 1 tablet (has no administration in time range)   ibuprofen tablet 800 mg (has no administration in time range)     Medical Decision Making  39-year-old male with IV drug use presents to the emergency department for pain and redness to his bilateral hands and left upper extremity where he injects his drugs.  Symptoms has been worsening for about a week.  Did not take temperature at home, reporting subjective fevers and chills.  Afebrile and nontoxic appearing here.  No leukocytosis from labs ordered at triage.  Evidence of a  drainable fluid collection or abscess.  Will give 1st dose of antibiotic here for cellulitis and discharge with antibiotics.  Counseled regarding IV drug use cessation as this will only worsen patient's infections.    Amount and/or Complexity of Data Reviewed  Labs: ordered. Decision-making details documented in ED Course.  Radiology: ordered and independent interpretation performed. Decision-making details documented in ED Course.     Details: Per independent interpretation by me, bilateral hand x-ray without acute bony abnormality.  No evidence of fracture or dislocation.  No foreign body noted.    Risk  Prescription drug management.               ED Course as of 03/14/25 2037   Fri Mar 14, 2025   2028 Given 1st dose antibiotic. All results were discussed with patient. Strict ED precautions and return instructions were discussed. All questions were answered. Instructed to follow up with primary care doctor for re-evaluation. Stable for discharge and outpatient follow up.   [KL]      ED Course User Index  [KL] Libby Carlton MD                           Clinical Impression:  Final diagnoses:  [T14.8XXA, L08.9] Wound infection  [T14.8XXA, L08.9] Wound infection - also r/o FB  [L03.113] Cellulitis of right upper extremity (Primary)  [F19.90] IV drug user  [L03.114] Cellulitis of left upper extremity          ED Disposition Condition    Discharge Stable          ED Prescriptions       Medication Sig Dispense Start Date End Date Auth. Provider    sulfamethoxazole-trimethoprim 800-160mg (BACTRIM DS) 800-160 mg Tab Take 1 tablet by mouth 2 (two) times daily. for 7 days 14 tablet 3/14/2025 3/21/2025 Libby Carlton MD          Follow-up Information       Follow up With Specialties Details Why Contact Info    [facility] - Emergency Dept Emergency Medicine Go to  As needed, If symptoms worsen [address] [facility] [address] [phone]    [facility]     [email or web address]    [facility]  Schedule an appointment as soon as possible for a visit in 1 day  [address] [facility] [address]    [facility] Internal Medicine, Family Medicine Schedule an appointment as soon as possible for a visit in 1 day  [address] [facility] [address]  [phone]                 [1]   Social History  Tobacco Use    Smoking status: Every Day     Current packs/day: 1.00     Types: Cigarettes    Smokeless tobacco: Never   Substance Use Topics    Alcohol use: Never    Drug use: Yes     Types: Methamphetamines, Heroin, Fentanyl     Comment: last used one week ago        Libby Carlton MD  03/14/25 2037

## 2025-03-15 NOTE — DISCHARGE INSTRUCTIONS
Take the antibiotics as instructed for your skin infection.  If you continue to inject drugs unfortunately the infection will only worsen.  Return to the emergency department if you develop fevers, worsening drainage or redness from the wounds.    Follow up with a [facility] or [facility] health services for a primary care doctor.